# Patient Record
Sex: MALE | Race: WHITE | Employment: OTHER | URBAN - METROPOLITAN AREA
[De-identification: names, ages, dates, MRNs, and addresses within clinical notes are randomized per-mention and may not be internally consistent; named-entity substitution may affect disease eponyms.]

---

## 2022-10-28 ENCOUNTER — OFFICE VISIT (OUTPATIENT)
Dept: VASCULAR SURGERY | Age: 62
End: 2022-10-28
Payer: COMMERCIAL

## 2022-10-28 ENCOUNTER — PREP FOR PROCEDURE (OUTPATIENT)
Dept: VASCULAR SURGERY | Age: 62
End: 2022-10-28

## 2022-10-28 VITALS
HEART RATE: 89 BPM | WEIGHT: 165 LBS | OXYGEN SATURATION: 95 % | BODY MASS INDEX: 24.44 KG/M2 | DIASTOLIC BLOOD PRESSURE: 75 MMHG | HEIGHT: 69 IN | SYSTOLIC BLOOD PRESSURE: 161 MMHG

## 2022-10-28 DIAGNOSIS — I73.9 PVD (PERIPHERAL VASCULAR DISEASE) WITH CLAUDICATION (HCC): Primary | ICD-10-CM

## 2022-10-28 PROCEDURE — 99213 OFFICE O/P EST LOW 20 MIN: CPT | Performed by: SURGERY

## 2022-10-28 RX ORDER — LOSARTAN POTASSIUM 50 MG/1
50 TABLET ORAL DAILY
COMMUNITY
Start: 2022-08-27

## 2022-10-28 RX ORDER — ROSUVASTATIN CALCIUM 20 MG/1
TABLET, COATED ORAL
COMMUNITY
Start: 2022-09-06 | End: 2022-10-31

## 2022-10-28 RX ORDER — TADALAFIL 5 MG/1
5 TABLET ORAL DAILY
COMMUNITY
Start: 2022-09-12

## 2022-10-28 RX ORDER — PRAVASTATIN SODIUM 40 MG
40 TABLET ORAL DAILY
COMMUNITY
End: 2022-10-31

## 2022-10-28 ASSESSMENT — PATIENT HEALTH QUESTIONNAIRE - PHQ9
SUM OF ALL RESPONSES TO PHQ QUESTIONS 1-9: 0
SUM OF ALL RESPONSES TO PHQ9 QUESTIONS 1 & 2: 0
SUM OF ALL RESPONSES TO PHQ QUESTIONS 1-9: 0
1. LITTLE INTEREST OR PLEASURE IN DOING THINGS: 0
SUM OF ALL RESPONSES TO PHQ QUESTIONS 1-9: 0
SUM OF ALL RESPONSES TO PHQ QUESTIONS 1-9: 0
2. FEELING DOWN, DEPRESSED OR HOPELESS: 0

## 2022-10-28 NOTE — PROGRESS NOTES
[address]   [address] [facility]. [address] FAX: [phone]    Kingsley Quiles  : 1960    Chief Complaint:     History of Present Illness:   Patient follows up today for follow-up for worsening right leg claudication. He had a CT scan done earlier sure show he has significant right common femoral artery disease. CURRENT MEDICATIONS:  Current Outpatient Medications   Medication Sig Dispense Refill    losartan (COZAAR) 50 MG tablet Take 50 mg by mouth daily      tadalafil (CIALIS) 5 MG tablet       pravastatin (PRAVACHOL) 40 MG tablet Take 40 mg by mouth daily      rosuvastatin (CRESTOR) 20 MG tablet  (Patient not taking: Reported on 10/28/2022)       No current facility-administered medications for this visit. No past medical history on file. Physical Examination:   Height: 5' 9\" (1.753 m), Weight: 165 lb (74.8 kg), BP: (!) 161/75    Constitutional: he appears well-developed. No distress. HENT:   Head: Atraumatic. Eyes: Pupils are equal, round, and reactive to light. Neck: Normal range of motion. Cardiovascular: Regular rhythm. Pulmonary/Chest: Effort normal and breath sounds normal. No respiratory distress. Abdominal: Soft. Bowel sounds are normal. he exhibits no distension. There is no tenderness. There is no guarding. No hernia. Musculoskeletal: Normal range of motion. Neurological: He is alert. CN II- XII grossly intact   Vascular: Palp femoral pulses    Imaging:    CORRIE 0.6 with a high-grade right common femoral artery disease      Recommendations/Plans:   Mr. Kingsley Quiles is a 57y.o. year old male significant right common femoral artery disease with questionable iliac disease. I will schedule patient for right common femoral endarterectomy with aortogram.  I discussed with the patient the risk benefits either. We will schedule patient in near future.     Daisy Odonnell MD    Elements of this note have been dictated using speech recognition software. As a result, errors of speech recognition may have occurred.     30 minutes of time was spent on this encounter including chart review, assessment, evaluation and coordination of patient care

## 2022-10-31 ENCOUNTER — HOSPITAL ENCOUNTER (OUTPATIENT)
Dept: PREADMISSION TESTING | Age: 62
Discharge: HOME | DRG: 254 | End: 2022-11-03
Payer: COMMERCIAL

## 2022-10-31 VITALS
SYSTOLIC BLOOD PRESSURE: 142 MMHG | TEMPERATURE: 98.2 F | BODY MASS INDEX: 24.38 KG/M2 | HEIGHT: 69 IN | DIASTOLIC BLOOD PRESSURE: 82 MMHG | OXYGEN SATURATION: 97 % | RESPIRATION RATE: 16 BRPM | WEIGHT: 164.6 LBS | HEART RATE: 72 BPM

## 2022-10-31 LAB
ANION GAP SERPL CALC-SCNC: 5 MMOL/L (ref 2–11)
BUN SERPL-MCNC: 26 MG/DL (ref 8–23)
CALCIUM SERPL-MCNC: 9.5 MG/DL (ref 8.3–10.4)
CHLORIDE SERPL-SCNC: 107 MMOL/L (ref 101–110)
CO2 SERPL-SCNC: 25 MMOL/L (ref 21–32)
CREAT SERPL-MCNC: 0.64 MG/DL (ref 0.8–1.5)
EKG ATRIAL RATE: 62 BPM
EKG DIAGNOSIS: NORMAL
EKG P AXIS: 74 DEGREES
EKG P-R INTERVAL: 168 MS
EKG Q-T INTERVAL: 408 MS
EKG QRS DURATION: 92 MS
EKG QTC CALCULATION (BAZETT): 414 MS
EKG R AXIS: 56 DEGREES
EKG T AXIS: 37 DEGREES
EKG VENTRICULAR RATE: 62 BPM
ERYTHROCYTE [DISTWIDTH] IN BLOOD BY AUTOMATED COUNT: 13.2 % (ref 11.9–14.6)
GLUCOSE SERPL-MCNC: 109 MG/DL (ref 65–100)
HCT VFR BLD AUTO: 44.4 % (ref 41.1–50.3)
HGB BLD-MCNC: 14.9 G/DL (ref 13.6–17.2)
MCH RBC QN AUTO: 31.4 PG (ref 26.1–32.9)
MCHC RBC AUTO-ENTMCNC: 33.6 G/DL (ref 31.4–35)
MCV RBC AUTO: 93.7 FL (ref 82–102)
NRBC # BLD: 0 K/UL (ref 0–0.2)
PLATELET # BLD AUTO: 220 K/UL (ref 150–450)
PMV BLD AUTO: 9.2 FL (ref 9.4–12.3)
POTASSIUM SERPL-SCNC: 3.6 MMOL/L (ref 3.5–5.1)
RBC # BLD AUTO: 4.74 M/UL (ref 4.23–5.6)
SODIUM SERPL-SCNC: 137 MMOL/L (ref 133–143)
WBC # BLD AUTO: 7.6 K/UL (ref 4.3–11.1)

## 2022-10-31 PROCEDURE — 85027 COMPLETE CBC AUTOMATED: CPT

## 2022-10-31 PROCEDURE — 80048 BASIC METABOLIC PNL TOTAL CA: CPT

## 2022-10-31 PROCEDURE — 93005 ELECTROCARDIOGRAM TRACING: CPT | Performed by: ANESTHESIOLOGY

## 2022-10-31 RX ORDER — ROSUVASTATIN CALCIUM 20 MG/1
20 TABLET, COATED ORAL DAILY
COMMUNITY

## 2022-10-31 NOTE — PERIOP NOTE
Patient verified name and     Order for consent not found in EHR; patient verified. Type 3 surgery, Walk in assessment complete. Labs per surgeon: none;   Labs per anesthesia protocol: CBC, BMP, T/S DOS; results pending. EKG: EKG 10.31.22    Hospital approved surgical skin cleanser and instructions given per hospital policy. Patient provided with and instructed on educational handouts including Guide to Surgery, Pain Management, Hand Hygiene, Blood Transfusion Education, and [facility] Brochure. Patient answered medical/surgical history questions at their best of ability. All prior to admission medications documented in [facility]. Original medication prescription bottle were  visualized during patient appointment. Patient instructed to hold all vitamins 7 days prior to surgery and NSAIDS 5 days prior to surgery, patient verbalized understanding. Patient teach back successful and patient demonstrates knowledge of instructions.

## 2022-10-31 NOTE — PERIOP NOTE
PLEASE CONTINUE TAKING ALL PRESCRIPTION MEDICATIONS UP TO THE DAY OF SURGERY UNLESS OTHERWISE DIRECTED BELOW. DISCONTINUE all vitamins and supplements 7 days prior to surgery. DISCONTINUE Non-Steriodal Anti-Inflammatory (NSAIDS) such as Advil and Aleve 5 days prior to surgery. Home Medications to take  the day of surgery     Rosuvastatin (Crestor)     Tadalafil (Cialis)        Home Medications   to Hold   No Vitamins, Supplements, or Anti-Inflammatories such as Aleve, Ibuprofen, Excedrin, Motrin, or Advil. Comments      Hibiclens shower the night before surgery and the morning of surgery. On the day before surgery please take Acetaminophen 1000mg in the morning and then again before bed. You may substitute for Tylenol 650 mg. Please do not bring home medications with you on the day of surgery unless otherwise directed by your nurse. If you are instructed to bring home medications, please give them to your nurse as they will be administered by the nursing staff. If you have any questions, please call [address] [place] [address] [phone] or 0 [facility] [phone]. A copy of this note was provided to the patient for reference.

## 2022-11-02 ENCOUNTER — ANESTHESIA EVENT (OUTPATIENT)
Dept: SURGERY | Age: 62
DRG: 254 | End: 2022-11-02
Payer: COMMERCIAL

## 2022-11-02 NOTE — PERIOP NOTE
Directly informed patient and or family member of pre op arrival time 0 on 11/3/22. All questions answered. Pre op instructions reviewed. Left contact information for any additional questions or needs.

## 2022-11-03 ENCOUNTER — ANESTHESIA (OUTPATIENT)
Dept: SURGERY | Age: 62
DRG: 254 | End: 2022-11-03
Payer: COMMERCIAL

## 2022-11-03 ENCOUNTER — APPOINTMENT (OUTPATIENT)
Dept: INTERVENTIONAL RADIOLOGY/VASCULAR | Age: 62
DRG: 254 | End: 2022-11-03
Attending: SURGERY
Payer: COMMERCIAL

## 2022-11-03 ENCOUNTER — HOSPITAL ENCOUNTER (INPATIENT)
Age: 62
LOS: 1 days | Discharge: HOME | DRG: 254 | End: 2022-11-04
Attending: SURGERY | Admitting: SURGERY
Payer: COMMERCIAL

## 2022-11-03 DIAGNOSIS — I73.9 PAD (PERIPHERAL ARTERY DISEASE) (HCC): Primary | ICD-10-CM

## 2022-11-03 DIAGNOSIS — I73.9 PERIPHERAL ARTERIAL DISEASE (HCC): ICD-10-CM

## 2022-11-03 LAB
ABO + RH BLD: NORMAL
BLOOD GROUP ANTIBODIES SERPL: NORMAL
CREAT BLD-MCNC: 0.76 MG/DL (ref 0.8–1.5)
SPECIMEN EXP DATE BLD: NORMAL

## 2022-11-03 PROCEDURE — 7100000000 HC PACU RECOVERY - FIRST 15 MIN: Performed by: SURGERY

## 2022-11-03 PROCEDURE — 6360000002 HC RX W HCPCS: Performed by: NURSE ANESTHETIST, CERTIFIED REGISTERED

## 2022-11-03 PROCEDURE — 2709999900 HC NON-CHARGEABLE SUPPLY: Performed by: SURGERY

## 2022-11-03 PROCEDURE — C1769 GUIDE WIRE: HCPCS | Performed by: SURGERY

## 2022-11-03 PROCEDURE — 6370000000 HC RX 637 (ALT 250 FOR IP): Performed by: SURGERY

## 2022-11-03 PROCEDURE — 3600000007 HC SURGERY HYBRID BASE: Performed by: SURGERY

## 2022-11-03 PROCEDURE — 2580000003 HC RX 258: Performed by: NURSE ANESTHETIST, CERTIFIED REGISTERED

## 2022-11-03 PROCEDURE — 6370000000 HC RX 637 (ALT 250 FOR IP): Performed by: ANESTHESIOLOGY

## 2022-11-03 PROCEDURE — 2500000003 HC RX 250 WO HCPCS: Performed by: SURGERY

## 2022-11-03 PROCEDURE — 86901 BLOOD TYPING SEROLOGIC RH(D): CPT

## 2022-11-03 PROCEDURE — C1894 INTRO/SHEATH, NON-LASER: HCPCS | Performed by: SURGERY

## 2022-11-03 PROCEDURE — 1100000000 HC RM PRIVATE

## 2022-11-03 PROCEDURE — 35371 RECHANNELING OF ARTERY: CPT | Performed by: SURGERY

## 2022-11-03 PROCEDURE — 6360000004 HC RX CONTRAST MEDICATION: Performed by: SURGERY

## 2022-11-03 PROCEDURE — 6360000002 HC RX W HCPCS: Performed by: ANESTHESIOLOGY

## 2022-11-03 PROCEDURE — 6360000002 HC RX W HCPCS: Performed by: SURGERY

## 2022-11-03 PROCEDURE — 3700000000 HC ANESTHESIA ATTENDED CARE: Performed by: SURGERY

## 2022-11-03 PROCEDURE — 2580000003 HC RX 258: Performed by: SURGERY

## 2022-11-03 PROCEDURE — 3700000001 HC ADD 15 MINUTES (ANESTHESIA): Performed by: SURGERY

## 2022-11-03 PROCEDURE — 82565 ASSAY OF CREATININE: CPT

## 2022-11-03 PROCEDURE — 3600000017 HC SURGERY HYBRID ADDL 15MIN: Performed by: SURGERY

## 2022-11-03 PROCEDURE — 2500000003 HC RX 250 WO HCPCS: Performed by: NURSE ANESTHETIST, CERTIFIED REGISTERED

## 2022-11-03 PROCEDURE — 7100000001 HC PACU RECOVERY - ADDTL 15 MIN: Performed by: SURGERY

## 2022-11-03 PROCEDURE — C1768 GRAFT, VASCULAR: HCPCS | Performed by: SURGERY

## 2022-11-03 PROCEDURE — 2580000003 HC RX 258: Performed by: ANESTHESIOLOGY

## 2022-11-03 DEVICE — VASCURE FOR VASCULAR REPAIR IS INTENDED FOR USE AS A PATCH MATERIAL FOR REPAIR AND RECONSTRUCTION OF PERIPHERAL VASCULATURE INCLUDING THE CAROTID, RENAL, ILIAC, FEMORAL, AND TIBIAL BLOOD VESSELS. VASCURE FOR VASCULAR REPAIR MAY BE USED FOR PATCH CLOSURE OF VESSELS, AS A PLEDGET, OR FOR SUTURE LINE BUTTRESSING WHEN REPAIRING PERIPHERAL VESSELS.
Type: IMPLANTABLE DEVICE | Site: GROIN | Status: FUNCTIONAL
Brand: VASCURE FOR VASCULAR REPAIR

## 2022-11-03 RX ORDER — SODIUM CHLORIDE, SODIUM LACTATE, POTASSIUM CHLORIDE, CALCIUM CHLORIDE 600; 310; 30; 20 MG/100ML; MG/100ML; MG/100ML; MG/100ML
INJECTION, SOLUTION INTRAVENOUS CONTINUOUS
Status: DISCONTINUED | OUTPATIENT
Start: 2022-11-03 | End: 2022-11-03 | Stop reason: HOSPADM

## 2022-11-03 RX ORDER — HEPARIN SODIUM 5000 [USP'U]/ML
INJECTION, SOLUTION INTRAVENOUS; SUBCUTANEOUS PRN
Status: DISCONTINUED | OUTPATIENT
Start: 2022-11-03 | End: 2022-11-03 | Stop reason: HOSPADM

## 2022-11-03 RX ORDER — PROPOFOL 10 MG/ML
INJECTION, EMULSION INTRAVENOUS PRN
Status: DISCONTINUED | OUTPATIENT
Start: 2022-11-03 | End: 2022-11-03 | Stop reason: SDUPTHER

## 2022-11-03 RX ORDER — LIDOCAINE HYDROCHLORIDE 10 MG/ML
1 INJECTION, SOLUTION INFILTRATION; PERINEURAL
Status: DISCONTINUED | OUTPATIENT
Start: 2022-11-03 | End: 2022-11-03 | Stop reason: HOSPADM

## 2022-11-03 RX ORDER — FENTANYL CITRATE 50 UG/ML
50 INJECTION, SOLUTION INTRAMUSCULAR; INTRAVENOUS EVERY 5 MIN PRN
Status: DISCONTINUED | OUTPATIENT
Start: 2022-11-03 | End: 2022-11-03 | Stop reason: HOSPADM

## 2022-11-03 RX ORDER — GLYCOPYRROLATE 0.2 MG/ML
INJECTION INTRAMUSCULAR; INTRAVENOUS PRN
Status: DISCONTINUED | OUTPATIENT
Start: 2022-11-03 | End: 2022-11-03 | Stop reason: SDUPTHER

## 2022-11-03 RX ORDER — LIDOCAINE HYDROCHLORIDE 20 MG/ML
INJECTION, SOLUTION EPIDURAL; INFILTRATION; INTRACAUDAL; PERINEURAL PRN
Status: DISCONTINUED | OUTPATIENT
Start: 2022-11-03 | End: 2022-11-03 | Stop reason: SDUPTHER

## 2022-11-03 RX ORDER — MIDAZOLAM HYDROCHLORIDE 2 MG/2ML
2 INJECTION, SOLUTION INTRAMUSCULAR; INTRAVENOUS
Status: DISCONTINUED | OUTPATIENT
Start: 2022-11-03 | End: 2022-11-03 | Stop reason: HOSPADM

## 2022-11-03 RX ORDER — SODIUM CHLORIDE 0.9 % (FLUSH) 0.9 %
5-40 SYRINGE (ML) INJECTION EVERY 12 HOURS SCHEDULED
Status: DISCONTINUED | OUTPATIENT
Start: 2022-11-03 | End: 2022-11-03 | Stop reason: HOSPADM

## 2022-11-03 RX ORDER — ONDANSETRON 2 MG/ML
INJECTION INTRAMUSCULAR; INTRAVENOUS PRN
Status: DISCONTINUED | OUTPATIENT
Start: 2022-11-03 | End: 2022-11-03 | Stop reason: SDUPTHER

## 2022-11-03 RX ORDER — BUPIVACAINE HYDROCHLORIDE 2.5 MG/ML
INJECTION, SOLUTION EPIDURAL; INFILTRATION; INTRACAUDAL PRN
Status: DISCONTINUED | OUTPATIENT
Start: 2022-11-03 | End: 2022-11-03 | Stop reason: HOSPADM

## 2022-11-03 RX ORDER — SODIUM CHLORIDE 9 MG/ML
INJECTION, SOLUTION INTRAVENOUS CONTINUOUS
Status: DISCONTINUED | OUTPATIENT
Start: 2022-11-03 | End: 2022-11-04 | Stop reason: HOSPADM

## 2022-11-03 RX ORDER — FENTANYL CITRATE 50 UG/ML
INJECTION, SOLUTION INTRAMUSCULAR; INTRAVENOUS PRN
Status: DISCONTINUED | OUTPATIENT
Start: 2022-11-03 | End: 2022-11-03 | Stop reason: SDUPTHER

## 2022-11-03 RX ORDER — HYDROMORPHONE HYDROCHLORIDE 2 MG/ML
0.5 INJECTION, SOLUTION INTRAMUSCULAR; INTRAVENOUS; SUBCUTANEOUS EVERY 10 MIN PRN
Status: DISCONTINUED | OUTPATIENT
Start: 2022-11-03 | End: 2022-11-03 | Stop reason: HOSPADM

## 2022-11-03 RX ORDER — ACETAMINOPHEN 325 MG/1
650 TABLET ORAL EVERY 4 HOURS PRN
Status: DISCONTINUED | OUTPATIENT
Start: 2022-11-03 | End: 2022-11-04 | Stop reason: HOSPADM

## 2022-11-03 RX ORDER — HEPARIN SODIUM 10000 [USP'U]/ML
INJECTION, SOLUTION INTRAVENOUS; SUBCUTANEOUS PRN
Status: DISCONTINUED | OUTPATIENT
Start: 2022-11-03 | End: 2022-11-03 | Stop reason: SDUPTHER

## 2022-11-03 RX ORDER — OXYCODONE HYDROCHLORIDE 5 MG/1
5 TABLET ORAL
Status: DISCONTINUED | OUTPATIENT
Start: 2022-11-03 | End: 2022-11-03 | Stop reason: HOSPADM

## 2022-11-03 RX ORDER — ATROPA BELLADONNA AND OPIUM 16.2; 3 MG/1; MG/1
30 SUPPOSITORY RECTAL
Status: DISCONTINUED | OUTPATIENT
Start: 2022-11-03 | End: 2022-11-04 | Stop reason: HOSPADM

## 2022-11-03 RX ORDER — PHENYLEPHRINE HYDROCHLORIDE 10 MG/ML
INJECTION INTRAVENOUS PRN
Status: DISCONTINUED | OUTPATIENT
Start: 2022-11-03 | End: 2022-11-03 | Stop reason: SDUPTHER

## 2022-11-03 RX ORDER — OXYCODONE HYDROCHLORIDE AND ACETAMINOPHEN 5; 325 MG/1; MG/1
1 TABLET ORAL EVERY 4 HOURS PRN
Status: DISCONTINUED | OUTPATIENT
Start: 2022-11-03 | End: 2022-11-04 | Stop reason: HOSPADM

## 2022-11-03 RX ORDER — HALOPERIDOL 5 MG/ML
1 INJECTION INTRAMUSCULAR
Status: DISCONTINUED | OUTPATIENT
Start: 2022-11-03 | End: 2022-11-03 | Stop reason: HOSPADM

## 2022-11-03 RX ORDER — EPHEDRINE SULFATE/0.9% NACL/PF 50 MG/5 ML
SYRINGE (ML) INTRAVENOUS PRN
Status: DISCONTINUED | OUTPATIENT
Start: 2022-11-03 | End: 2022-11-03 | Stop reason: SDUPTHER

## 2022-11-03 RX ORDER — HEPARIN SODIUM 200 [USP'U]/100ML
INJECTION, SOLUTION INTRAVENOUS CONTINUOUS PRN
Status: DISCONTINUED | OUTPATIENT
Start: 2022-11-03 | End: 2022-11-03 | Stop reason: HOSPADM

## 2022-11-03 RX ORDER — ATROPA BELLADONNA AND OPIUM 16.2; 6 MG/1; MG/1
60 SUPPOSITORY RECTAL
Status: DISCONTINUED | OUTPATIENT
Start: 2022-11-03 | End: 2022-11-03 | Stop reason: CLARIF

## 2022-11-03 RX ORDER — ACETAMINOPHEN 500 MG
1000 TABLET ORAL ONCE
Status: COMPLETED | OUTPATIENT
Start: 2022-11-03 | End: 2022-11-03

## 2022-11-03 RX ORDER — PHENAZOPYRIDINE HYDROCHLORIDE 95 MG/1
200 TABLET ORAL
Status: COMPLETED | OUTPATIENT
Start: 2022-11-03 | End: 2022-11-03

## 2022-11-03 RX ORDER — ROCURONIUM BROMIDE 10 MG/ML
INJECTION, SOLUTION INTRAVENOUS PRN
Status: DISCONTINUED | OUTPATIENT
Start: 2022-11-03 | End: 2022-11-03 | Stop reason: SDUPTHER

## 2022-11-03 RX ORDER — MORPHINE SULFATE 2 MG/ML
2 INJECTION, SOLUTION INTRAMUSCULAR; INTRAVENOUS
Status: DISCONTINUED | OUTPATIENT
Start: 2022-11-03 | End: 2022-11-04 | Stop reason: HOSPADM

## 2022-11-03 RX ORDER — IPRATROPIUM BROMIDE AND ALBUTEROL SULFATE 2.5; .5 MG/3ML; MG/3ML
1 SOLUTION RESPIRATORY (INHALATION)
Status: DISCONTINUED | OUTPATIENT
Start: 2022-11-03 | End: 2022-11-03 | Stop reason: HOSPADM

## 2022-11-03 RX ORDER — NEOSTIGMINE METHYLSULFATE 1 MG/ML
INJECTION, SOLUTION INTRAVENOUS PRN
Status: DISCONTINUED | OUTPATIENT
Start: 2022-11-03 | End: 2022-11-03 | Stop reason: SDUPTHER

## 2022-11-03 RX ORDER — PROTAMINE SULFATE 10 MG/ML
INJECTION, SOLUTION INTRAVENOUS PRN
Status: DISCONTINUED | OUTPATIENT
Start: 2022-11-03 | End: 2022-11-03 | Stop reason: SDUPTHER

## 2022-11-03 RX ORDER — ONDANSETRON 2 MG/ML
4 INJECTION INTRAMUSCULAR; INTRAVENOUS
Status: DISCONTINUED | OUTPATIENT
Start: 2022-11-03 | End: 2022-11-03 | Stop reason: HOSPADM

## 2022-11-03 RX ORDER — SODIUM CHLORIDE 0.9 % (FLUSH) 0.9 %
5-40 SYRINGE (ML) INJECTION PRN
Status: DISCONTINUED | OUTPATIENT
Start: 2022-11-03 | End: 2022-11-03 | Stop reason: HOSPADM

## 2022-11-03 RX ADMIN — HEPARIN SODIUM 11000 UNITS: 10000 INJECTION, SOLUTION INTRAVENOUS; SUBCUTANEOUS at 07:56

## 2022-11-03 RX ADMIN — SODIUM CHLORIDE, SODIUM LACTATE, POTASSIUM CHLORIDE, AND CALCIUM CHLORIDE: 600; 310; 30; 20 INJECTION, SOLUTION INTRAVENOUS at 07:09

## 2022-11-03 RX ADMIN — PROPOFOL 60 MG: 10 INJECTION, EMULSION INTRAVENOUS at 07:22

## 2022-11-03 RX ADMIN — GLYCOPYRROLATE 0.2 MG: 0.2 INJECTION, SOLUTION INTRAMUSCULAR; INTRAVENOUS at 07:33

## 2022-11-03 RX ADMIN — HYDROMORPHONE HYDROCHLORIDE 0.5 MG: 2 INJECTION INTRAMUSCULAR; INTRAVENOUS; SUBCUTANEOUS at 10:00

## 2022-11-03 RX ADMIN — SODIUM CHLORIDE, SODIUM LACTATE, POTASSIUM CHLORIDE, AND CALCIUM CHLORIDE: 600; 310; 30; 20 INJECTION, SOLUTION INTRAVENOUS at 05:54

## 2022-11-03 RX ADMIN — HEPARIN SODIUM 2000 UNITS: 10000 INJECTION, SOLUTION INTRAVENOUS; SUBCUTANEOUS at 08:05

## 2022-11-03 RX ADMIN — Medication 10 MG: at 07:38

## 2022-11-03 RX ADMIN — PHENYLEPHRINE HYDROCHLORIDE 200 MCG: 10 INJECTION INTRAVENOUS at 07:34

## 2022-11-03 RX ADMIN — SODIUM CHLORIDE, SODIUM LACTATE, POTASSIUM CHLORIDE, AND CALCIUM CHLORIDE: 600; 310; 30; 20 INJECTION, SOLUTION INTRAVENOUS at 08:57

## 2022-11-03 RX ADMIN — SODIUM CHLORIDE: 9 INJECTION, SOLUTION INTRAVENOUS at 12:54

## 2022-11-03 RX ADMIN — ONDANSETRON 4 MG: 2 INJECTION INTRAMUSCULAR; INTRAVENOUS at 07:49

## 2022-11-03 RX ADMIN — GLYCOPYRROLATE 0.4 MG: 0.2 INJECTION, SOLUTION INTRAMUSCULAR; INTRAVENOUS at 09:11

## 2022-11-03 RX ADMIN — ROCURONIUM BROMIDE 40 MG: 50 INJECTION, SOLUTION INTRAVENOUS at 07:20

## 2022-11-03 RX ADMIN — ACETAMINOPHEN 1000 MG: 500 TABLET, FILM COATED ORAL at 05:55

## 2022-11-03 RX ADMIN — PHENYLEPHRINE HYDROCHLORIDE 100 MCG: 10 INJECTION INTRAVENOUS at 07:40

## 2022-11-03 RX ADMIN — OXYCODONE AND ACETAMINOPHEN 1 TABLET: 5; 325 TABLET ORAL at 16:48

## 2022-11-03 RX ADMIN — LIDOCAINE HYDROCHLORIDE 100 MG: 20 INJECTION, SOLUTION EPIDURAL; INFILTRATION; INTRACAUDAL; PERINEURAL at 07:20

## 2022-11-03 RX ADMIN — PHENYLEPHRINE HYDROCHLORIDE 200 MCG: 10 INJECTION INTRAVENOUS at 07:38

## 2022-11-03 RX ADMIN — Medication 3 MG: at 09:11

## 2022-11-03 RX ADMIN — FENTANYL CITRATE 100 MCG: 50 INJECTION, SOLUTION INTRAMUSCULAR; INTRAVENOUS at 07:20

## 2022-11-03 RX ADMIN — CEFAZOLIN SODIUM 2000 MG: 100 INJECTION, POWDER, LYOPHILIZED, FOR SOLUTION INTRAVENOUS at 17:30

## 2022-11-03 RX ADMIN — HYDROMORPHONE HYDROCHLORIDE 0.5 MG: 2 INJECTION INTRAMUSCULAR; INTRAVENOUS; SUBCUTANEOUS at 10:31

## 2022-11-03 RX ADMIN — PROTAMINE SULFATE 50 MG: 10 INJECTION, SOLUTION INTRAVENOUS at 09:00

## 2022-11-03 RX ADMIN — ROCURONIUM BROMIDE 10 MG: 50 INJECTION, SOLUTION INTRAVENOUS at 08:30

## 2022-11-03 RX ADMIN — ACETAMINOPHEN 650 MG: 325 TABLET, FILM COATED ORAL at 22:19

## 2022-11-03 RX ADMIN — ROCURONIUM BROMIDE 20 MG: 50 INJECTION, SOLUTION INTRAVENOUS at 08:04

## 2022-11-03 RX ADMIN — PROPOFOL 100 MG: 10 INJECTION, EMULSION INTRAVENOUS at 07:20

## 2022-11-03 RX ADMIN — Medication 10 MG: at 07:35

## 2022-11-03 RX ADMIN — PHENYLEPHRINE HYDROCHLORIDE 200 MCG: 10 INJECTION INTRAVENOUS at 08:55

## 2022-11-03 RX ADMIN — Medication 2000 MG: at 07:15

## 2022-11-03 RX ADMIN — PHENYLEPHRINE HYDROCHLORIDE 10 MCG/MIN: 10 INJECTION INTRAVENOUS at 08:15

## 2022-11-03 RX ADMIN — HYDROMORPHONE HYDROCHLORIDE 0.5 MG: 2 INJECTION INTRAMUSCULAR; INTRAVENOUS; SUBCUTANEOUS at 10:10

## 2022-11-03 RX ADMIN — OXYCODONE AND ACETAMINOPHEN 1 TABLET: 5; 325 TABLET ORAL at 12:52

## 2022-11-03 RX ADMIN — PHENAZOPYRIDINE HYDROCHLORIDE 190 MG: 95 TABLET ORAL at 10:31

## 2022-11-03 ASSESSMENT — PAIN DESCRIPTION - DESCRIPTORS
DESCRIPTORS: SORE

## 2022-11-03 ASSESSMENT — PAIN SCALES - GENERAL
PAINLEVEL_OUTOF10: 8
PAINLEVEL_OUTOF10: 7
PAINLEVEL_OUTOF10: 3
PAINLEVEL_OUTOF10: 6
PAINLEVEL_OUTOF10: 6

## 2022-11-03 ASSESSMENT — PAIN DESCRIPTION - ORIENTATION
ORIENTATION: RIGHT

## 2022-11-03 ASSESSMENT — PAIN DESCRIPTION - LOCATION
LOCATION: GROIN
LOCATION: LEG
LOCATION: LEG
LOCATION: GROIN
LOCATION: LEG

## 2022-11-03 ASSESSMENT — PAIN - FUNCTIONAL ASSESSMENT
PAIN_FUNCTIONAL_ASSESSMENT: 0-10
PAIN_FUNCTIONAL_ASSESSMENT: ACTIVITIES ARE NOT PREVENTED
PAIN_FUNCTIONAL_ASSESSMENT: 0-10
PAIN_FUNCTIONAL_ASSESSMENT: NONE - DENIES PAIN
PAIN_FUNCTIONAL_ASSESSMENT: ACTIVITIES ARE NOT PREVENTED

## 2022-11-03 ASSESSMENT — LIFESTYLE VARIABLES: SMOKING_STATUS: 1

## 2022-11-03 NOTE — PROGRESS NOTES
Status post right common femoral endarterectomy. Patient was transferred to the floor overnight for observation. Patient has palpable femoral pulses nonpalpable pedal pulses.     Thanh Leaks

## 2022-11-03 NOTE — ANESTHESIA PROCEDURE NOTES
Airway  Date/Time: 11/3/2022 7:24 AM  Urgency: elective    Airway not difficult    General Information and Staff    Patient location during procedure: OR  Anesthesiologist: Lisseth Norman MD  Resident/CRNA: CLAUDIA Kuhn - CRNA  Performed: resident/CRNA     Indications and Patient Condition  Indications for airway management: anesthesia  Spontaneous Ventilation: absent  Sedation level: deep  Preoxygenated: yes  Patient position: sniffing  MILS not maintained throughout  Mask difficulty assessment: vent by bag mask    Final Airway Details  Final airway type: endotracheal airway      Successful airway: ETT  Cuffed: yes   Successful intubation technique: direct laryngoscopy  Facilitating devices/methods: intubating stylet  Endotracheal tube insertion site: oral  Blade: Buckner  Blade size: #3  ETT size (mm): 8.0  Cormack-Lehane Classification: grade I - full view of glottis  Placement verified by: chest auscultation and capnometry   Measured from: teeth  ETT to teeth (cm): 23  Number of attempts at approach: 1  Ventilation between attempts: bag mask  Number of other approaches attempted: 0    no

## 2022-11-03 NOTE — OP NOTE
[facility]  OPERATIVE REPORT    Name:  Kelton Kendall  MR#:  [id number]  :  1960  ACCOUNT #:  [de-identified]  DATE OF SERVICE:  2022    SERVICE:  Vascular Surgery    PREOPERATIVE DIAGNOSIS:  Debilitating right leg claudication. POSTOPERATIVE DIAGNOSIS:  Debilitating right leg claudication. PROCEDURE PERFORMED:  Right common femoral endarterectomy with bovine pericardial patch closure. SURGEON:  Laurent Rondon MD    ASSISTANT:  None. ANESTHESIA:  General.    COMPLICATIONS:  None. SPECIMENS REMOVED:  None. IMPLANTS:      ESTIMATED BLOOD LOSS:      PROCEDURE:  After getting informed consent, the patient brought to the operating room. Anesthesia was then induced. Preop antibiotics were given before skin incisions. The patient's right leg was then prepped and draped in normal sterile fashion. Incision was made with #15 blade on the inguinal ligament. We dissected down through subcutaneous tissue. Down to the proximal SFA and profunda which was controlled with vessel loops. All side branches were also controlled with vessel loops including the medial and lateral epigastric. The distal external was controlled with vessel loops. Then, we gave 13,000 heparin. We got control. 11-blade was used to made in the proximal SFA and extended through the heavily diseased near occlusive plaque in the common just on the inguinal ligament. The vein of sorrow was identified and suture ligated with 2-0 silk ties. Using a Pe Ell elevator, we removed the plaque just proximal to the profunda orifices and removed all the way up into the just below the inguinal ligament in one piece. It was concentrated plaque. All remaining debris was removed with fine forceps. We backflushed the SFA, the profunda and external.  We then brought to field a CorMatrix patch. We did patch angioplasty with 6-0 Prolene proximally. We ran on both sides and ran distally.   Before we flushed the external, backflushed the profunda and SFA. We restored flow first into the profunda then to the SFA. Patient had good Doppler signal to profunda and SFA. We held pressure for five minutes and closed with 2-0 Vicryl, 3-0 Vicryl, and 4-0 Monocryl. The patient was extubated and taken to PACU in stable condition.       Lila Hartman MD      [id number]  D:  11/03/2022 9:48  T:  11/03/2022 11:50  JOB #:  [id number]

## 2022-11-03 NOTE — ANESTHESIA PRE PROCEDURE
Department of Anesthesiology  Preprocedure Note       Name:  Mayda Owusu   Age:  57 y.o.  :  1960                                          MRN:  [id number]         Date:  11/3/2022      Surgeon: Eve Perez):  Maribel Jolley MD    Procedure: Procedure(s):  RIGHT COMMON FEMORAL ENDARTERECTOMY W/ARTERIOGRAM  ARTERIOGRAM    Medications prior to admission:   Prior to Admission medications    Medication Sig Start Date End Date Taking?  Authorizing Provider   rosuvastatin (CRESTOR) 20 MG tablet Take 20 mg by mouth daily    Historical Provider, MD   losartan (COZAAR) 50 MG tablet Take 50 mg by mouth daily 22   Historical Provider, MD   tadalafil (CIALIS) 5 MG tablet Take 5 mg by mouth daily 22   Historical Provider, MD       Current medications:    Current Facility-Administered Medications   Medication Dose Route Frequency Provider Last Rate Last Admin    ceFAZolin (ANCEF) 2000 mg in sterile water 20 mL IV syringe  2,000 mg IntraVENous Once Maribel Jolley MD        lidocaine 1 % injection 1 mL  1 mL IntraDERmal Once PRN Luis Golden MD        famotidine (PEPCID) 20 mg in sodium chloride (PF) 0.9 % 10 mL injection  20 mg IntraVENous Once PRN Luis Golden MD        lactated ringers infusion   IntraVENous Continuous Luis Golden MD 30 mL/hr at 22 0554 New Bag at 22 0554    midazolam PF (VERSED) injection 2 mg  2 mg IntraVENous Once PRN Luis Golden MD        ipratropium-albuterol (DUONEB) nebulizer solution 1 ampule  1 ampule Inhalation Once PRN Luis Golden MD           Allergies:  No Known Allergies    Problem List:    Patient Active Problem List   Diagnosis Code    Peripheral arterial disease ([facility] [address] I73.9       Past Medical History:        Diagnosis Date    Cigarette nicotine dependence     HTN (hypertension)     Hypercholesterolemia     Peripheral arterial disease ([facility] [address]     Prostate enlargement     Right leg pain        Past Surgical History: Procedure Laterality Date    COLONOSCOPY      FEMORAL ENDARTERECTOMY Left     KNEE ARTHROSCOPY Bilateral     NECK SURGERY      SHOULDER ARTHROSCOPY Bilateral     TONSILLECTOMY      WISDOM TOOTH EXTRACTION         Social History:    Social History     Tobacco Use    Smoking status: Every Day     Packs/day: 1.00     Years: 44.00     Pack years: 44.00     Types: Cigarettes     Start date: 1972    Smokeless tobacco: Never   Substance Use Topics    Alcohol use: Yes     Comment: occassionally                                Ready to quit: Not Answered  Counseling given: Not Answered      Vital Signs (Current):   Vitals:    11/03/22 0533   BP: (!) 154/90   Pulse: 72   Resp: 18   Temp: 97.2 °F (36.2 °C)   TempSrc: Oral   SpO2: 95%   Weight: 165 lb 4 oz (75 kg)   Height: 5' 9\" (1.753 m)                                              BP Readings from Last 3 Encounters:   11/03/22 (!) 154/90   10/31/22 (!) 142/82   10/28/22 (!) 161/75       NPO Status: Time of last liquid consumption: 0000                        Time of last solid consumption: 0000                        Date of last liquid consumption: 11/02/22                        Date of last solid food consumption: 11/02/22    BMI:   Wt Readings from Last 3 Encounters:   11/03/22 165 lb 4 oz (75 kg)   10/31/22 164 lb 9.6 oz (74.7 kg)   10/28/22 165 lb (74.8 kg)     Body mass index is 24.4 kg/m².     CBC:   Lab Results   Component Value Date/Time    WBC 7.6 10/31/2022 11:31 AM    RBC 4.74 10/31/2022 11:31 AM    HGB 14.9 10/31/2022 11:31 AM    HCT 44.4 10/31/2022 11:31 AM    MCV 93.7 10/31/2022 11:31 AM    RDW 13.2 10/31/2022 11:31 AM     10/31/2022 11:31 AM       CMP:   Lab Results   Component Value Date/Time     10/31/2022 11:31 AM    K 3.6 10/31/2022 11:31 AM     10/31/2022 11:31 AM    CO2 25 10/31/2022 11:31 AM    BUN 26 10/31/2022 11:31 AM    CREATININE 0.76 11/03/2022 05:50 AM    CREATININE 0.64 10/31/2022 11:31 AM    LABGLOM >60 10/31/2022 11:31 AM    GLUCOSE 109 10/31/2022 11:31 AM    CALCIUM 9.5 10/31/2022 11:31 AM       POC Tests: No results for input(s): POCGLU, POCNA, POCK, POCCL, POCBUN, POCHEMO, POCHCT in the last 72 hours. Coags: No results found for: PROTIME, INR, APTT    HCG (If Applicable): No results found for: PREGTESTUR, PREGSERUM, HCG, HCGQUANT     ABGs: No results found for: PHART, PO2ART, TQK3ZQE, HKG5QJL, BEART, T2JPBBSN     Type & Screen (If Applicable):  No results found for: LABABO, LABRH    Drug/Infectious Status (If Applicable):  No results found for: HIV, HEPCAB    COVID-19 Screening (If Applicable): No results found for: COVID19        Anesthesia Evaluation  Patient summary reviewed  Airway: Mallampati: II          Dental: normal exam         Pulmonary:Negative Pulmonary ROS breath sounds clear to auscultation  (+) current smoker                           Cardiovascular:  Exercise tolerance: good (>4 METS),   (+) hypertension:,     (-) past MI, murmur and peripheral edema      Rhythm: regular  Rate: normal                    Neuro/Psych:   Negative Neuro/Psych ROS     (-) CVA           GI/Hepatic/Renal: Neg GI/Hepatic/Renal ROS            Endo/Other: Negative Endo/Other ROS                    Abdominal:             Vascular: negative vascular ROS.  + PVD, aortic or cerebral, . Other Findings:           Anesthesia Plan      general     ASA 3     (GETA)  Induction: intravenous. Anesthetic plan and risks discussed with patient.                         Sia Flores MD   11/3/2022

## 2022-11-03 NOTE — PLAN OF CARE
Problem: Pain  Goal: Verbalizes/displays adequate comfort level or baseline comfort level  Outcome: Progressing     Problem: Discharge Planning  Goal: Discharge to home or other facility with appropriate resources  Outcome: Progressing  Flowsheets (Taken 11/3/2022 1252)  Discharge to home or other facility with appropriate resources:   Arrange for needed discharge resources and transportation as appropriate   Identify barriers to discharge with patient and caregiver   Identify discharge learning needs (meds, wound care, etc)   Refer to discharge planning if patient needs post-hospital services based on physician order or complex needs related to functional status, cognitive ability or social support system

## 2022-11-03 NOTE — ANESTHESIA POSTPROCEDURE EVALUATION
Department of Anesthesiology  Postprocedure Note    Patient: Jhonny Moore  MRN: [id number]  YOB: 1960  Date of evaluation: 11/3/2022      Procedure Summary     Date: 11/03/22 Room / Location: [facility] MAIN OR  / [facility] MAIN OR    Anesthesia Start: 4485 Anesthesia Stop: 8811    Procedure: RIGHT COMMON FEMORAL ENDARTERECTOMY (Right: Groin) Diagnosis:       Peripheral arterial disease ([address]      (Peripheral arterial disease ([address] [I73.9])    Providers: Mahendra Mendez MD Responsible Provider: Pierre Levin MD    Anesthesia Type: general ASA Status: 3          Anesthesia Type: No value filed.     Jimenez Phase I: Jimenez Score: 8    Jimenez Phase II:        Anesthesia Post Evaluation    Patient location during evaluation: PACU  Patient participation: complete - patient participated  Level of consciousness: awake and alert  Airway patency: patent  Nausea & Vomiting: no nausea and no vomiting  Complications: no  Cardiovascular status: hemodynamically stable  Respiratory status: acceptable, nonlabored ventilation and spontaneous ventilation  Hydration status: euvolemic  Comments: /76   Pulse 69   Temp 97.9 °F (36.6 °C) (Temporal)   Resp (P) 16   Ht 5' 9\" (1.753 m)   Wt 165 lb 4 oz (75 kg)   SpO2 98%   BMI 24.40 kg/m²     Multimodal analgesia pain management approach

## 2022-11-03 NOTE — PERIOP NOTE
Dr. Jaylen Berry at [facility]. Gave RN order to remove catheter in PACU. RN to place dc order.

## 2022-11-03 NOTE — BRIEF OP NOTE
[address]   [address] FAX: [phone]    Brief Op Note Template Note    Pre-Op Diagnosis: Peripheral arterial disease ([facility] [address] [I73.9]    Post-Op Diagnosis:  * No post-op diagnosis entered *    Procedures: Procedure(s) with comments:  RIGHT COMMON FEMORAL ENDARTERECTOMY W/ARTERIOGRAM - BOOK IN OR #12    Surgeon: Tapan Forbes MD    Assistants: Surgeon(s):  Erasmo Rutledge MD      Anesthesia:  General     Findings: right  femoral plaque    Tourniquet Time:  * No tourniquets in log *    Estimated Blood Loss:               Specimens:            Implants:    Implant Name Type Inv. Item Serial No.  Lot No. LRB No. Used Action   PATCH CAR TISS REP [employer] 1 X 10 CM - [id number]  PATCH CAR TISS REP [employer] 1 X 10 CM  [employer] [id number] Right 1 Implanted       Complications: None               Signed: Tapan Forbes MD      Elements of this note have been dictated using speech recognition software. As a result, errors of speech recognition may have occurred.

## 2022-11-04 VITALS
DIASTOLIC BLOOD PRESSURE: 84 MMHG | OXYGEN SATURATION: 96 % | SYSTOLIC BLOOD PRESSURE: 133 MMHG | WEIGHT: 165.25 LBS | HEIGHT: 69 IN | BODY MASS INDEX: 24.48 KG/M2 | HEART RATE: 73 BPM | RESPIRATION RATE: 17 BRPM | TEMPERATURE: 98.2 F

## 2022-11-04 PROCEDURE — 6360000002 HC RX W HCPCS: Performed by: SURGERY

## 2022-11-04 PROCEDURE — 2500000003 HC RX 250 WO HCPCS: Performed by: SURGERY

## 2022-11-04 PROCEDURE — 04CK0ZZ EXTIRPATION OF MATTER FROM RIGHT FEMORAL ARTERY, OPEN APPROACH: ICD-10-PCS | Performed by: SURGERY

## 2022-11-04 PROCEDURE — 04UK0KZ SUPPLEMENT RIGHT FEMORAL ARTERY WITH NONAUTOLOGOUS TISSUE SUBSTITUTE, OPEN APPROACH: ICD-10-PCS | Performed by: SURGERY

## 2022-11-04 PROCEDURE — 6370000000 HC RX 637 (ALT 250 FOR IP): Performed by: SURGERY

## 2022-11-04 PROCEDURE — 99024 POSTOP FOLLOW-UP VISIT: CPT | Performed by: SURGERY

## 2022-11-04 RX ORDER — OXYCODONE HYDROCHLORIDE AND ACETAMINOPHEN 5; 325 MG/1; MG/1
1 TABLET ORAL EVERY 4 HOURS PRN
Qty: 30 TABLET | Refills: 0 | Status: SHIPPED | OUTPATIENT
Start: 2022-11-04 | End: 2022-11-07

## 2022-11-04 RX ADMIN — OXYCODONE AND ACETAMINOPHEN 1 TABLET: 5; 325 TABLET ORAL at 09:06

## 2022-11-04 RX ADMIN — CEFAZOLIN SODIUM 2000 MG: 100 INJECTION, POWDER, LYOPHILIZED, FOR SOLUTION INTRAVENOUS at 01:42

## 2022-11-04 ASSESSMENT — PAIN DESCRIPTION - LOCATION: LOCATION: GROIN

## 2022-11-04 ASSESSMENT — PAIN DESCRIPTION - DESCRIPTORS: DESCRIPTORS: ACHING

## 2022-11-04 ASSESSMENT — PAIN DESCRIPTION - ORIENTATION: ORIENTATION: RIGHT

## 2022-11-04 ASSESSMENT — PAIN SCALES - GENERAL: PAINLEVEL_OUTOF10: 8

## 2022-11-04 NOTE — PLAN OF CARE
Problem: Pain  Goal: Verbalizes/displays adequate comfort level or baseline comfort level  Outcome: Adequate for Discharge     Problem: Discharge Planning  Goal: Discharge to home or other facility with appropriate resources  Outcome: Adequate for Discharge  Flowsheets (Taken 11/3/2022 1940 by Wolf Poole RN)  Discharge to home or other facility with appropriate resources:   Identify barriers to discharge with patient and caregiver   Identify discharge learning needs (meds, wound care, etc)

## 2022-11-04 NOTE — CARE COORDINATION
Pt with discharge orders this day. Chart screened by  for discharge planning. No CM needs identified at time of discharge. Milestones met.         11/04/22 1042   Service Assessment   Patient Orientation Alert and Oriented   Cognition Alert   History Provided By Medical Record   Primary Caregiver Self   Support Systems Spouse/Significant Other   PCP Verified by CM Yes   Last Visit to PCP Within last two years   Prior Functional Level Independent in ADLs/IADLs   Current Functional Level Independent in ADLs/IADLs   Can patient return to prior living arrangement Yes   Ability to make needs known: Good   Family able to assist with home care needs: Yes   Condition of Participation: Discharge Planning   The Plan for Transition of Care is related to the following treatment goals: Home with spouse

## 2022-11-04 NOTE — PROGRESS NOTES
[address] FAX: [phone]         VASCULAR SURGERY FLOOR PROGRESS NOTE    Admit Date: 11/3/2022  POD: 1 Day Post-Op    Procedure:  Procedure(s):  RIGHT COMMON FEMORAL ENDARTERECTOMY    Subjective:     Patient has no new complaints and has no complaints. Objective:     Vitals:  Blood pressure 120/68, pulse 68, temperature 97.9 °F (36.6 °C), temperature source Temporal, resp. rate 17, height 5' 9\" (1.753 m), weight 165 lb 4 oz (75 kg), SpO2 96 %. Temp (24hrs), Av.9 °F (36.6 °C), Min:97.7 °F (36.5 °C), Max:98 °F (36.7 °C)      Intake / Output:    Intake/Output Summary (Last 24 hours) at 2022 0644  Last data filed at 11/3/2022 1803  Gross per 24 hour   Intake 1525 ml   Output 425 ml   Net 1100 ml       Physical Exam:    Constitutional: he appears well-developed. No distress. HENT:   Head: Atraumatic. Eyes: Pupils are equal, round, and reactive to light. Neck: Normal range of motion. Cardiovascular: Regular rhythm. Pulmonary/Chest: Effort normal and breath sounds normal. No respiratory distress. Abdominal: Soft. Bowel sounds are normal. he exhibits no distension. There is no tenderness. There is no guarding. No hernia. Musculoskeletal: Normal range of motion. Neurological: He is alert. CN II- XII grossly intact  Vascular: incision intact, foot warm     Labs: No results for input(s): HGB, WBC, K, GLU in the last 72 hours. Invalid input(s):  CREA    Data Review     Assessment:     Patient Active Problem List    Diagnosis Date Noted    PAD (peripheral artery disease) ([facility] [address] 2022    Peripheral arterial disease ([facility] [address] 10/28/2022       Plan/Recommendations/Medical Decision Making:     Plan d/c home follow up 2 weeks    Elements of this note have been dictated using speech recognition software. As a result, errors of speech recognition may have occurred.

## 2022-11-04 NOTE — DISCHARGE INSTRUCTIONS
MD Instructions:   Wound care:   - Wash groin wounds daily with liquid antibacterial soap (for example, Dial); use fingers or soft washcloth gently then pat area dry. - Keep incision / staples clean and dry, may cover with gauze. - Do not apply lotions, creams or ointments to incisions. - Tissue adhesive (\"skin glue\") used over incision will flake or peel off on its own. Diet:   - As tolerated before surgery. Activity:   - Don't overdo your activity throughout the day for the next 10-14 days - no prolonged standing, no lifting more than 8 lb. - Keep legs propped up when not walking.   - Do not drive or drink alcohol while taking narcotics. - Participate in physical therapy / occupational therapy per facility staff. - Starting Saturday you may shower - no tub baths, soaking or swimming. Medication   - Use prescription pain medications if needed; if you do not wish to use narcotic pain medication or require less pain control, you may take acetaminophen (Tylenol, for example) or naproxen (Aleve, for example) following instructions on the label.   - Resume other home medications. ?   Follow up in the office with Dr. Shira Christiansen on ***/2020 at [facility]. If problems or questions arise, please call our office at [phone].

## 2022-11-04 NOTE — PROGRESS NOTES
Discharge instructions, follow up appointments and prescriptions reviewed with patient and family. Both verbalize understanding. All personal belongings taken with patient. Family member will drive patient home. Patient escorted to discharge area via wheelchair. Patient is stable at discharge.       IV removed

## 2022-11-07 ENCOUNTER — TELEPHONE (OUTPATIENT)
Dept: VASCULAR SURGERY | Age: 62
End: 2022-11-07

## 2022-11-07 NOTE — TELEPHONE ENCOUNTER
Pt c/o having knot R-groin (surgery 11-3CFA)  R-knee down to ankle swelling  Pt sent pictures    **per DTW -reviewed pictures he stated wound looks good and use ice    --informed pt of this, call us back if symptoms get worse

## 2022-11-10 ENCOUNTER — TELEPHONE (OUTPATIENT)
Dept: VASCULAR SURGERY | Age: 62
End: 2022-11-10

## 2022-11-10 NOTE — TELEPHONE ENCOUNTER
Pt still c/o R-groin incision knot( getting bigger) wants to be seen sooner than 11-18    --appt made for tomorrow w/ Thierry Carroll NP

## 2022-11-11 ENCOUNTER — OFFICE VISIT (OUTPATIENT)
Dept: VASCULAR SURGERY | Age: 62
End: 2022-11-11

## 2022-11-11 VITALS
DIASTOLIC BLOOD PRESSURE: 84 MMHG | OXYGEN SATURATION: 96 % | BODY MASS INDEX: 24.29 KG/M2 | SYSTOLIC BLOOD PRESSURE: 165 MMHG | HEIGHT: 69 IN | WEIGHT: 164 LBS | HEART RATE: 83 BPM

## 2022-11-11 DIAGNOSIS — I73.9 PAD (PERIPHERAL ARTERY DISEASE) (HCC): Primary | ICD-10-CM

## 2022-11-11 PROCEDURE — 99024 POSTOP FOLLOW-UP VISIT: CPT | Performed by: NURSE PRACTITIONER

## 2022-11-11 RX ORDER — AMOXICILLIN AND CLAVULANATE POTASSIUM 875; 125 MG/1; MG/1
1 TABLET, FILM COATED ORAL 2 TIMES DAILY
Qty: 24 TABLET | Refills: 0 | Status: SHIPPED | OUTPATIENT
Start: 2022-11-11 | End: 2022-11-23

## 2022-11-11 ASSESSMENT — PATIENT HEALTH QUESTIONNAIRE - PHQ9
SUM OF ALL RESPONSES TO PHQ9 QUESTIONS 1 & 2: 0
2. FEELING DOWN, DEPRESSED OR HOPELESS: 0
SUM OF ALL RESPONSES TO PHQ QUESTIONS 1-9: 0
1. LITTLE INTEREST OR PLEASURE IN DOING THINGS: 0
SUM OF ALL RESPONSES TO PHQ QUESTIONS 1-9: 0

## 2022-11-11 NOTE — PROGRESS NOTES
DATE OF VISIT: 11/11/2022      [facility]  Johnna Cooks is a 57 y.o. male who follows up for a wound recheck to his right groin. He denies any N/V/F/D. He noticed a lump to his right groin and reports that it is getting larger and is having some mild discomfort. He is also c/o right leg edema. He underwent a right common femoral endarterectomy with bovine pericardial patch closure by Dr. Sarahy Lee on 11/3/22. MEDICAL HISTORY:   Past Medical History:   Diagnosis Date    Cigarette nicotine dependence     HTN (hypertension)     Hypercholesterolemia     Peripheral arterial disease (HCC)     Prostate enlargement     Right leg pain          SURGICAL HISTORY:   Past Surgical History:   Procedure Laterality Date    COLONOSCOPY      FEMORAL ENDARTERECTOMY Left     FEMORAL ENDARTERECTOMY Right 11/3/2022    RIGHT COMMON FEMORAL ENDARTERECTOMY performed by Miguel Ángel Mata MD at [facility] MAIN OR    KNEE ARTHROSCOPY Bilateral     NECK SURGERY      SHOULDER ARTHROSCOPY Bilateral     TONSILLECTOMY      WISDOM TOOTH EXTRACTION         Review of Systems:  Constitutional:   Negative for fevers and unexplained weight loss. Respiratory:   Negative for hemoptysis. Cardiovascular:   Negative except as noted in HPI. Musculoskeletal:  Negative for active, unexplained/severe joint pain. ALLERGIES: No Known Allergies    CURRENT MEDICATIONS:  Current Outpatient Medications   Medication Sig Dispense Refill    amoxicillin-clavulanate (AUGMENTIN) 875-125 MG per tablet Take 1 tablet by mouth 2 times daily for 12 days 24 tablet 0    rosuvastatin (CRESTOR) 20 MG tablet Take 20 mg by mouth daily      losartan (COZAAR) 50 MG tablet Take 50 mg by mouth daily      tadalafil (CIALIS) 5 MG tablet Take 5 mg by mouth daily       No current facility-administered medications for this visit. IMAGING: Interpretation Summary         No evidence of deep vein thrombosis in the right lower extremity.  Vessels demonstrate normal compressibility, color filling, and phasic and spontaneous flow. There is a complex fluid collection in the right groin measuring 6.7 x 4.0 x 3.6cm. Procedure Details    A gray scale, color Doppler imaging and spectral Doppler analysis ultrasound was performed. During the study longitudinal and transverse views were obtained. Pulsed wave doppler was performed. Overall the study quality was good. Lower Extremity Venous Findings      Right Lower Venous    No evidence of deep vein or superficial vein thrombosis. The common femoral, saphenofemoral junction, profunda femoral, femoral, popliteal,  gastrocnemius, soleal, greater saphenous, and small saphenous veins were imaged in the transverse view and showed normal compressibility. The common femoral, popliteal, and middle femoral veins were imaged in the longitudinal view and showed normal color filling and normal phasic and spontaneous flow. Common Femoral Vein: Patent, normal phasicity, spontaneous, normal augmentation, compressible. Greater Saphenous Vein: Entire vessel patent, compressible  Saphenofemoral Junction: Patent, compressible. Profunda Femoral Vein: Patent, compressible. Femoral Vein: Patent, normal phasicity, spontaneous, normal augmentation, compressible  Popliteal Vein: Patent, normal phasicity, spontaneous, normal augmentation, compressible. Posterior Tibial Vein: Patent, compressible. Peroneal Vein: Patent, compressible.        Right Lower Arterial Measurements     PSV Celso Ratio   CFA Prox 236 cm/s           PFA Prox 58.9 cm/s           SFA Prox 76.3 cm/s        0.3                                    Procedure Staff    Technologist/Clinician: Kalpana Dee  Supporting Staff: None  Performing Physician/Midlevel: None     Exam Completion Date/Time: 11/11/22 12:29 PM      PHYSICAL EXAM  VITALS: BP (!) 165/84 (Site: Right Upper Arm, Position: Sitting, Cuff Size: Medium Adult)   Pulse 83   Ht 5' 9\" (1.753 m)   Wt 164 lb (74.4 kg) SpO2 96%   BMI 24.22 kg/m²       GENERAL: Well developed, well nourished, in NAD  HEAD/NECK: normocephalic, atraumatic, neck supple  HEART: Regular rate and rhythm  ABDOMEN: soft, nontender, nondistended  EXTREMITIES: upper without CCE with palpable radial and brachial pulses. Lower extremities: palpable distal pulses. lump to right groin  MUSCULOSKELETAL: normal gait  NEURO: sensation and strength grossly intact and symmetrical  PSYCH: alert and oriented to person, place and time      Assessment/Plan: Patient is a 57year old male who follows up for a wound recheck to his right groin. He is post right common femoral endarterectomy on 11/3/2022. He does have some edema to his right leg and a lump to his right groin. Ultrasound duplex was performed in the office and is negative for a DVT. He does have a complex fluid collection to his right groin. I am going to prophylactically prescribe Augmentin 875 mg for 12 days. He is to wash the incision line with antibacterial soap and water pat dry. Will keep his regularly scheduled follow-up appt for next week, sooner should any issues arise.          Maggi Galvan, APRN - CNP    20 minutes of time was spent on this encounter including chart review, assessment and evaluation

## 2022-11-18 ENCOUNTER — OFFICE VISIT (OUTPATIENT)
Dept: VASCULAR SURGERY | Age: 62
End: 2022-11-18
Payer: COMMERCIAL

## 2022-11-18 VITALS
HEART RATE: 79 BPM | TEMPERATURE: 98.1 F | HEIGHT: 69 IN | SYSTOLIC BLOOD PRESSURE: 156 MMHG | DIASTOLIC BLOOD PRESSURE: 93 MMHG | WEIGHT: 164 LBS | BODY MASS INDEX: 24.29 KG/M2 | OXYGEN SATURATION: 97 %

## 2022-11-18 DIAGNOSIS — I73.9 PVD (PERIPHERAL VASCULAR DISEASE) WITH CLAUDICATION (HCC): Primary | ICD-10-CM

## 2022-11-18 PROCEDURE — 99213 OFFICE O/P EST LOW 20 MIN: CPT | Performed by: SURGERY

## 2022-11-18 ASSESSMENT — PATIENT HEALTH QUESTIONNAIRE - PHQ9
SUM OF ALL RESPONSES TO PHQ9 QUESTIONS 1 & 2: 0
SUM OF ALL RESPONSES TO PHQ QUESTIONS 1-9: 0
2. FEELING DOWN, DEPRESSED OR HOPELESS: 0
SUM OF ALL RESPONSES TO PHQ QUESTIONS 1-9: 0
1. LITTLE INTEREST OR PLEASURE IN DOING THINGS: 0
SUM OF ALL RESPONSES TO PHQ QUESTIONS 1-9: 0
SUM OF ALL RESPONSES TO PHQ QUESTIONS 1-9: 0

## 2022-11-18 NOTE — PROGRESS NOTES
[address]   [address] [facility] FAX: [phone]    Tami Sandra  : 1960    Chief Complaint:     History of Present Illness:   Patient follows up today for follow-up status post right common femoral endarterectomy. Patient denies any claudication or rest pain symptoms. CURRENT MEDICATIONS:  Current Outpatient Medications   Medication Sig Dispense Refill    amoxicillin-clavulanate (AUGMENTIN) 875-125 MG per tablet Take 1 tablet by mouth 2 times daily for 12 days 24 tablet 0    rosuvastatin (CRESTOR) 20 MG tablet Take 20 mg by mouth daily      losartan (COZAAR) 50 MG tablet Take 50 mg by mouth daily      tadalafil (CIALIS) 5 MG tablet Take 5 mg by mouth daily       No current facility-administered medications for this visit. Past Medical History:   Diagnosis Date    Cigarette nicotine dependence     HTN (hypertension)     Hypercholesterolemia     Peripheral arterial disease (HCC)     Prostate enlargement     Right leg pain        Physical Examination:   Height: 5' 9\" (1.753 m), Weight: 164 lb (74.4 kg), BP: (!) 156/93    Constitutional: he appears well-developed. No distress. HENT:   Head: Atraumatic. Eyes: Pupils are equal, round, and reactive to light. Neck: Normal range of motion. Cardiovascular: Regular rhythm. Pulmonary/Chest: Effort normal and breath sounds normal. No respiratory distress. Abdominal: Soft. Bowel sounds are normal. he exhibits no distension. There is no tenderness. There is no guarding. No hernia. Musculoskeletal: Normal range of motion. Neurological: He is alert. CN II- XII grossly intact   Vascular: Incision intact Doppler pedal pulses    Imaging:          Recommendations/Plans:   Mr. Tami Sandra is a 57y.o. year old male status post right common femoral endarterectomy well-perfused with Doppler signals and follow-up in 6 months with a duplex study.     Luisa Ko MD    Elements of this note have been dictated using speech recognition software. As a result, errors of speech recognition may have occurred.     20 minutes of time was spent on this encounter including chart review, assessment, evaluation and coordination of patient care

## 2023-12-29 ENCOUNTER — PREP FOR PROCEDURE (OUTPATIENT)
Dept: ENT CLINIC | Age: 63
End: 2023-12-29

## 2023-12-29 ENCOUNTER — OFFICE VISIT (OUTPATIENT)
Dept: ENT CLINIC | Age: 63
End: 2023-12-29
Payer: MEDICARE

## 2023-12-29 VITALS — RESPIRATION RATE: 18 BRPM | HEIGHT: 69 IN | WEIGHT: 166 LBS | BODY MASS INDEX: 24.59 KG/M2

## 2023-12-29 DIAGNOSIS — C04.9 SQUAMOUS CELL CARCINOMA OF FLOOR OF MOUTH (HCC): Primary | ICD-10-CM

## 2023-12-29 DIAGNOSIS — C06.9 SQUAMOUS CELL CARCINOMA OF MOUTH (HCC): ICD-10-CM

## 2023-12-29 PROCEDURE — 99204 OFFICE O/P NEW MOD 45 MIN: CPT | Performed by: OTOLARYNGOLOGY

## 2023-12-29 PROCEDURE — 1123F ACP DISCUSS/DSCN MKR DOCD: CPT | Performed by: OTOLARYNGOLOGY

## 2023-12-29 RX ORDER — PRAVASTATIN SODIUM 40 MG
40 TABLET ORAL NIGHTLY
COMMUNITY

## 2023-12-29 RX ORDER — LOSARTAN POTASSIUM 100 MG/1
TABLET ORAL
COMMUNITY
Start: 2023-11-29

## 2023-12-29 NOTE — PROGRESS NOTES
Chief Complaint   Patient presents with    Mouth Lesions     Pt states that he had a lesion on the floor of his mouth and that the biopsy came back as cancer and was referred here. HPI:  Yash Draper is a 70 y.o. male seen today in initial consultation at the request of Dr. Tom Donnelly for newly diagnosed as SCCA of the floor of mouth. He first noticed irritation along the floor mouth back in September 2022. He was traveling out in [place] in his RV and had a cracked tooth. He saw his a local dentist out there and underwent dental extraction. Since then, he has been dealing with some irritation and mild discomfort in that area. He saw his dentist on several occasions, but his exam was clear. More recently, he saw Dr. Tom Donnelly who noted a small ulcer along the left floor of mouth. He underwent incisional biopsy earlier this month which was positive for moderately differentiated SCCA. There was no perineural invasion or lymphovascular invasion, and the depth of invasion was 1.9 mm. He has no previous oral pathology, and he is healed up well since his biopsy. He denies any localized pain, bleeding or purulence currently. No imaging studies yet. Past Medical History, Past Surgical History, Family history, Social History, and Medications were all reviewed with the patient today and updated as necessary. No Known Allergies  Patient Active Problem List   Diagnosis    Peripheral arterial disease (HCC)    PAD (peripheral artery disease) (HCC)    Squamous cell carcinoma of mouth (HCC)     Current Outpatient Medications   Medication Sig    losartan (COZAAR) 100 MG tablet     pravastatin (PRAVACHOL) 40 MG tablet Take 1 tablet by mouth nightly    tadalafil (CIALIS) 5 MG tablet Take 1 tablet by mouth daily     No current facility-administered medications for this visit.      Past Medical History:   Diagnosis Date    Cigarette nicotine dependence     HTN (hypertension)     Hypercholesterolemia     Peripheral

## 2024-01-02 ENCOUNTER — HOSPITAL ENCOUNTER (OUTPATIENT)
Dept: CT IMAGING | Age: 64
Discharge: HOME | End: 2024-01-05
Attending: OTOLARYNGOLOGY
Payer: MEDICARE

## 2024-01-02 DIAGNOSIS — G89.18 POST-OP PAIN: Primary | ICD-10-CM

## 2024-01-02 DIAGNOSIS — C04.9 SQUAMOUS CELL CARCINOMA OF FLOOR OF MOUTH (HCC): ICD-10-CM

## 2024-01-02 LAB — CREAT BLD-MCNC: 0.56 MG/DL (ref 0.8–1.5)

## 2024-01-02 PROCEDURE — 70491 CT SOFT TISSUE NECK W/DYE: CPT

## 2024-01-02 PROCEDURE — 6360000004 HC RX CONTRAST MEDICATION: Performed by: OTOLARYNGOLOGY

## 2024-01-02 PROCEDURE — 82565 ASSAY OF CREATININE: CPT

## 2024-01-02 RX ORDER — HYDROCODONE BITARTRATE AND ACETAMINOPHEN 5; 325 MG/1; MG/1
1 TABLET ORAL EVERY 4 HOURS PRN
Qty: 30 TABLET | Refills: 0 | Status: SHIPPED | OUTPATIENT
Start: 2024-01-02 | End: 2024-01-07

## 2024-01-02 RX ORDER — ONDANSETRON 4 MG/1
4 TABLET, ORALLY DISINTEGRATING ORAL 3 TIMES DAILY PRN
Qty: 10 TABLET | Refills: 0 | Status: SHIPPED | OUTPATIENT
Start: 2024-01-02

## 2024-01-02 RX ORDER — CEPHALEXIN 500 MG/1
500 CAPSULE ORAL 4 TIMES DAILY
Qty: 28 CAPSULE | Refills: 0 | Status: SHIPPED | OUTPATIENT
Start: 2024-01-02

## 2024-01-02 RX ADMIN — IOPAMIDOL 80 ML: 755 INJECTION, SOLUTION INTRAVENOUS at 09:19

## 2024-01-02 NOTE — TELEPHONE ENCOUNTER
I have reviewed the provider's pre-op instructions with the patient, answering all questions to their satisfaction. History & Physical & ROS updated with patient . Post operative medications have been sent to pharmacy and instructions have been given to patient on how to take. Patient informed if they have any questions or concerns to please call the office.

## 2024-01-02 NOTE — PERIOP NOTE
Patient verified name and .  Order for consent  found in EHR and matches case posting; patient verifies procedure.     Type 1B surgery, Phone assessment complete.  Orders  received.  Labs per surgeon: none  Labs per anesthesia protocol: None    Patient answered medical/surgical history questions at their best of ability. All prior to admission medications documented in EPIC.  Patient instructed to take the following medications the day of surgery according to anesthesia guidelines with a small sip of water: none.   Hold all vitamins 7 days prior to surgery and NSAIDS 5 days prior to surgery. Prescription meds to hold:Cialis    Patient instructed on the following:    > Arrive at Main Entrance, time of arrival to be called the day before by 1700  > NPO after midnight, unless otherwise indicated, including gum, mints, and ice chips  > Responsible adult must drive patient to the hospital, stay during surgery, and patient will need supervision 24 hours after anesthesia  > Use non moisturizing soap in shower the night before surgery and on the morning of surgery  > All piercings must be removed prior to arrival.    > Leave all valuables (money and jewelry) at home but bring insurance card and ID on DOS.   > You may be required to pay a deductible or co-pay on the day of your procedure. You can pre-pay by calling [phone] if your surgery is at the [facility] or [phone] if your surgery is at the [facility].  > Do not wear make-up, nail polish, lotions, cologne, perfumes, powders, or oil on skin. Artificial nails are not permitted.

## 2024-01-03 ENCOUNTER — ANESTHESIA EVENT (OUTPATIENT)
Dept: SURGERY | Age: 64
End: 2024-01-03
Payer: MEDICARE

## 2024-01-04 ENCOUNTER — ANESTHESIA (OUTPATIENT)
Dept: SURGERY | Age: 64
End: 2024-01-04
Payer: MEDICARE

## 2024-01-04 ENCOUNTER — HOSPITAL ENCOUNTER (OUTPATIENT)
Age: 64
Setting detail: OUTPATIENT SURGERY
Discharge: HOME | End: 2024-01-04
Attending: OTOLARYNGOLOGY | Admitting: OTOLARYNGOLOGY
Payer: MEDICARE

## 2024-01-04 VITALS
HEIGHT: 69 IN | RESPIRATION RATE: 16 BRPM | DIASTOLIC BLOOD PRESSURE: 73 MMHG | HEART RATE: 79 BPM | OXYGEN SATURATION: 92 % | BODY MASS INDEX: 24.79 KG/M2 | SYSTOLIC BLOOD PRESSURE: 140 MMHG | TEMPERATURE: 98.1 F | WEIGHT: 167.4 LBS

## 2024-01-04 DIAGNOSIS — C06.9 SQUAMOUS CELL CARCINOMA OF MOUTH (HCC): ICD-10-CM

## 2024-01-04 PROCEDURE — 3700000001 HC ADD 15 MINUTES (ANESTHESIA): Performed by: OTOLARYNGOLOGY

## 2024-01-04 PROCEDURE — 3600000002 HC SURGERY LEVEL 2 BASE: Performed by: OTOLARYNGOLOGY

## 2024-01-04 PROCEDURE — 3700000000 HC ANESTHESIA ATTENDED CARE: Performed by: OTOLARYNGOLOGY

## 2024-01-04 PROCEDURE — 6370000000 HC RX 637 (ALT 250 FOR IP): Performed by: ANESTHESIOLOGY

## 2024-01-04 PROCEDURE — 3600000012 HC SURGERY LEVEL 2 ADDTL 15MIN: Performed by: OTOLARYNGOLOGY

## 2024-01-04 PROCEDURE — 2500000003 HC RX 250 WO HCPCS: Performed by: OTOLARYNGOLOGY

## 2024-01-04 PROCEDURE — 7100000010 HC PHASE II RECOVERY - FIRST 15 MIN: Performed by: OTOLARYNGOLOGY

## 2024-01-04 PROCEDURE — 7100000011 HC PHASE II RECOVERY - ADDTL 15 MIN: Performed by: OTOLARYNGOLOGY

## 2024-01-04 PROCEDURE — 2580000003 HC RX 258: Performed by: ANESTHESIOLOGY

## 2024-01-04 PROCEDURE — 6360000002 HC RX W HCPCS

## 2024-01-04 PROCEDURE — 88307 TISSUE EXAM BY PATHOLOGIST: CPT

## 2024-01-04 PROCEDURE — 2500000003 HC RX 250 WO HCPCS

## 2024-01-04 PROCEDURE — 7100000001 HC PACU RECOVERY - ADDTL 15 MIN: Performed by: OTOLARYNGOLOGY

## 2024-01-04 PROCEDURE — 88331 PATH CONSLTJ SURG 1 BLK 1SPC: CPT

## 2024-01-04 PROCEDURE — 2709999900 HC NON-CHARGEABLE SUPPLY: Performed by: OTOLARYNGOLOGY

## 2024-01-04 PROCEDURE — 7100000000 HC PACU RECOVERY - FIRST 15 MIN: Performed by: OTOLARYNGOLOGY

## 2024-01-04 PROCEDURE — 88305 TISSUE EXAM BY PATHOLOGIST: CPT

## 2024-01-04 RX ORDER — SODIUM CHLORIDE 0.9 % (FLUSH) 0.9 %
5-40 SYRINGE (ML) INJECTION EVERY 12 HOURS SCHEDULED
Status: DISCONTINUED | OUTPATIENT
Start: 2024-01-04 | End: 2024-01-04 | Stop reason: HOSPADM

## 2024-01-04 RX ORDER — SODIUM CHLORIDE 0.9 % (FLUSH) 0.9 %
5-40 SYRINGE (ML) INJECTION PRN
Status: DISCONTINUED | OUTPATIENT
Start: 2024-01-04 | End: 2024-01-04 | Stop reason: HOSPADM

## 2024-01-04 RX ORDER — FENTANYL CITRATE 50 UG/ML
INJECTION, SOLUTION INTRAMUSCULAR; INTRAVENOUS PRN
Status: DISCONTINUED | OUTPATIENT
Start: 2024-01-04 | End: 2024-01-04 | Stop reason: SDUPTHER

## 2024-01-04 RX ORDER — SODIUM CHLORIDE, SODIUM LACTATE, POTASSIUM CHLORIDE, CALCIUM CHLORIDE 600; 310; 30; 20 MG/100ML; MG/100ML; MG/100ML; MG/100ML
INJECTION, SOLUTION INTRAVENOUS CONTINUOUS
Status: DISCONTINUED | OUTPATIENT
Start: 2024-01-04 | End: 2024-01-04 | Stop reason: HOSPADM

## 2024-01-04 RX ORDER — NEOSTIGMINE METHYLSULFATE 1 MG/ML
INJECTION, SOLUTION INTRAVENOUS PRN
Status: DISCONTINUED | OUTPATIENT
Start: 2024-01-04 | End: 2024-01-04 | Stop reason: SDUPTHER

## 2024-01-04 RX ORDER — DIPHENHYDRAMINE HYDROCHLORIDE 50 MG/ML
12.5 INJECTION INTRAMUSCULAR; INTRAVENOUS
Status: DISCONTINUED | OUTPATIENT
Start: 2024-01-04 | End: 2024-01-04 | Stop reason: HOSPADM

## 2024-01-04 RX ORDER — MIDAZOLAM HYDROCHLORIDE 2 MG/2ML
2 INJECTION, SOLUTION INTRAMUSCULAR; INTRAVENOUS
Status: DISCONTINUED | OUTPATIENT
Start: 2024-01-04 | End: 2024-01-04 | Stop reason: HOSPADM

## 2024-01-04 RX ORDER — EPHEDRINE SULFATE/0.9% NACL/PF 50 MG/5 ML
SYRINGE (ML) INTRAVENOUS PRN
Status: DISCONTINUED | OUTPATIENT
Start: 2024-01-04 | End: 2024-01-04 | Stop reason: SDUPTHER

## 2024-01-04 RX ORDER — GLYCOPYRROLATE 0.2 MG/ML
INJECTION INTRAMUSCULAR; INTRAVENOUS PRN
Status: DISCONTINUED | OUTPATIENT
Start: 2024-01-04 | End: 2024-01-04 | Stop reason: SDUPTHER

## 2024-01-04 RX ORDER — PHENYLEPHRINE HYDROCHLORIDE 10 MG/ML
INJECTION INTRAVENOUS PRN
Status: DISCONTINUED | OUTPATIENT
Start: 2024-01-04 | End: 2024-01-04 | Stop reason: SDUPTHER

## 2024-01-04 RX ORDER — HYDROMORPHONE HYDROCHLORIDE 2 MG/ML
0.5 INJECTION, SOLUTION INTRAMUSCULAR; INTRAVENOUS; SUBCUTANEOUS EVERY 10 MIN PRN
Status: DISCONTINUED | OUTPATIENT
Start: 2024-01-04 | End: 2024-01-04 | Stop reason: HOSPADM

## 2024-01-04 RX ORDER — MIDAZOLAM HYDROCHLORIDE 1 MG/ML
INJECTION INTRAMUSCULAR; INTRAVENOUS PRN
Status: DISCONTINUED | OUTPATIENT
Start: 2024-01-04 | End: 2024-01-04 | Stop reason: SDUPTHER

## 2024-01-04 RX ORDER — OXYCODONE HYDROCHLORIDE 5 MG/1
5 TABLET ORAL
Status: COMPLETED | OUTPATIENT
Start: 2024-01-04 | End: 2024-01-04

## 2024-01-04 RX ORDER — LIDOCAINE HYDROCHLORIDE 20 MG/ML
INJECTION, SOLUTION EPIDURAL; INFILTRATION; INTRACAUDAL; PERINEURAL PRN
Status: DISCONTINUED | OUTPATIENT
Start: 2024-01-04 | End: 2024-01-04 | Stop reason: SDUPTHER

## 2024-01-04 RX ORDER — SODIUM CHLORIDE 9 MG/ML
INJECTION, SOLUTION INTRAVENOUS PRN
Status: DISCONTINUED | OUTPATIENT
Start: 2024-01-04 | End: 2024-01-04 | Stop reason: HOSPADM

## 2024-01-04 RX ORDER — DEXTROSE MONOHYDRATE 100 MG/ML
INJECTION, SOLUTION INTRAVENOUS CONTINUOUS PRN
Status: DISCONTINUED | OUTPATIENT
Start: 2024-01-04 | End: 2024-01-04 | Stop reason: HOSPADM

## 2024-01-04 RX ORDER — PROCHLORPERAZINE EDISYLATE 5 MG/ML
5 INJECTION INTRAMUSCULAR; INTRAVENOUS
Status: DISCONTINUED | OUTPATIENT
Start: 2024-01-04 | End: 2024-01-04 | Stop reason: HOSPADM

## 2024-01-04 RX ORDER — PROPOFOL 10 MG/ML
INJECTION, EMULSION INTRAVENOUS PRN
Status: DISCONTINUED | OUTPATIENT
Start: 2024-01-04 | End: 2024-01-04 | Stop reason: SDUPTHER

## 2024-01-04 RX ORDER — ESMOLOL HYDROCHLORIDE 10 MG/ML
INJECTION, SOLUTION INTRAVENOUS PRN
Status: DISCONTINUED | OUTPATIENT
Start: 2024-01-04 | End: 2024-01-04 | Stop reason: SDUPTHER

## 2024-01-04 RX ORDER — LIDOCAINE HYDROCHLORIDE 10 MG/ML
1 INJECTION, SOLUTION INFILTRATION; PERINEURAL
Status: DISCONTINUED | OUTPATIENT
Start: 2024-01-04 | End: 2024-01-04 | Stop reason: HOSPADM

## 2024-01-04 RX ORDER — SUCCINYLCHOLINE CHLORIDE 20 MG/ML
INJECTION INTRAMUSCULAR; INTRAVENOUS PRN
Status: DISCONTINUED | OUTPATIENT
Start: 2024-01-04 | End: 2024-01-04 | Stop reason: SDUPTHER

## 2024-01-04 RX ORDER — LIDOCAINE HYDROCHLORIDE AND EPINEPHRINE 10; 10 MG/ML; UG/ML
INJECTION, SOLUTION INFILTRATION; PERINEURAL PRN
Status: DISCONTINUED | OUTPATIENT
Start: 2024-01-04 | End: 2024-01-04 | Stop reason: ALTCHOICE

## 2024-01-04 RX ORDER — ACETAMINOPHEN 500 MG
1000 TABLET ORAL ONCE
Status: COMPLETED | OUTPATIENT
Start: 2024-01-04 | End: 2024-01-04

## 2024-01-04 RX ORDER — ROCURONIUM BROMIDE 10 MG/ML
INJECTION, SOLUTION INTRAVENOUS PRN
Status: DISCONTINUED | OUTPATIENT
Start: 2024-01-04 | End: 2024-01-04 | Stop reason: SDUPTHER

## 2024-01-04 RX ORDER — FENTANYL CITRATE 50 UG/ML
100 INJECTION, SOLUTION INTRAMUSCULAR; INTRAVENOUS
Status: DISCONTINUED | OUTPATIENT
Start: 2024-01-04 | End: 2024-01-04 | Stop reason: HOSPADM

## 2024-01-04 RX ORDER — CLINDAMYCIN PHOSPHATE 600 MG/50ML
INJECTION, SOLUTION INTRAVENOUS PRN
Status: DISCONTINUED | OUTPATIENT
Start: 2024-01-04 | End: 2024-01-04 | Stop reason: SDUPTHER

## 2024-01-04 RX ADMIN — SODIUM CHLORIDE, SODIUM LACTATE, POTASSIUM CHLORIDE, AND CALCIUM CHLORIDE: 600; 310; 30; 20 INJECTION, SOLUTION INTRAVENOUS at 10:31

## 2024-01-04 RX ADMIN — MIDAZOLAM 2 MG: 1 INJECTION INTRAMUSCULAR; INTRAVENOUS at 12:44

## 2024-01-04 RX ADMIN — ROCURONIUM BROMIDE 40 MG: 10 INJECTION, SOLUTION INTRAVENOUS at 13:02

## 2024-01-04 RX ADMIN — ROCURONIUM BROMIDE 10 MG: 10 INJECTION, SOLUTION INTRAVENOUS at 13:20

## 2024-01-04 RX ADMIN — ESMOLOL HYDROCHLORIDE 20 MG: 10 INJECTION INTRAVENOUS at 14:28

## 2024-01-04 RX ADMIN — SODIUM CHLORIDE, SODIUM LACTATE, POTASSIUM CHLORIDE, AND CALCIUM CHLORIDE: 600; 310; 30; 20 INJECTION, SOLUTION INTRAVENOUS at 13:46

## 2024-01-04 RX ADMIN — Medication 5 MG: at 13:58

## 2024-01-04 RX ADMIN — PROPOFOL 200 MG: 10 INJECTION, EMULSION INTRAVENOUS at 12:49

## 2024-01-04 RX ADMIN — Medication 5 MG: at 13:15

## 2024-01-04 RX ADMIN — ACETAMINOPHEN 1000 MG: 500 TABLET ORAL at 10:25

## 2024-01-04 RX ADMIN — FENTANYL CITRATE 100 MCG: 50 INJECTION, SOLUTION INTRAMUSCULAR; INTRAVENOUS at 12:49

## 2024-01-04 RX ADMIN — PHENYLEPHRINE HYDROCHLORIDE 100 MCG: 10 INJECTION INTRAVENOUS at 13:58

## 2024-01-04 RX ADMIN — Medication 10 MG: at 13:46

## 2024-01-04 RX ADMIN — LIDOCAINE HYDROCHLORIDE 100 MG: 20 INJECTION, SOLUTION EPIDURAL; INFILTRATION; INTRACAUDAL; PERINEURAL at 12:49

## 2024-01-04 RX ADMIN — PHENYLEPHRINE HYDROCHLORIDE 100 MCG: 10 INJECTION INTRAVENOUS at 13:15

## 2024-01-04 RX ADMIN — PROPOFOL 50 MG: 10 INJECTION, EMULSION INTRAVENOUS at 13:04

## 2024-01-04 RX ADMIN — PHENYLEPHRINE HYDROCHLORIDE 100 MCG: 10 INJECTION INTRAVENOUS at 12:58

## 2024-01-04 RX ADMIN — ROCURONIUM BROMIDE 10 MG: 10 INJECTION, SOLUTION INTRAVENOUS at 13:50

## 2024-01-04 RX ADMIN — PHENYLEPHRINE HYDROCHLORIDE 100 MCG: 10 INJECTION INTRAVENOUS at 13:46

## 2024-01-04 RX ADMIN — Medication 10 MG: at 13:38

## 2024-01-04 RX ADMIN — Medication 10 MG: at 13:22

## 2024-01-04 RX ADMIN — PHENYLEPHRINE HYDROCHLORIDE 100 MCG: 10 INJECTION INTRAVENOUS at 13:04

## 2024-01-04 RX ADMIN — CLINDAMYCIN PHOSPHATE 600 MG: 600 INJECTION, SOLUTION INTRAVENOUS at 12:56

## 2024-01-04 RX ADMIN — Medication 200 MG: at 12:50

## 2024-01-04 RX ADMIN — PHENYLEPHRINE HYDROCHLORIDE 200 MCG: 10 INJECTION INTRAVENOUS at 13:12

## 2024-01-04 RX ADMIN — GLYCOPYRROLATE 0.8 MG: 0.2 INJECTION INTRAMUSCULAR; INTRAVENOUS at 14:15

## 2024-01-04 RX ADMIN — PROPOFOL 50 MG: 10 INJECTION, EMULSION INTRAVENOUS at 13:01

## 2024-01-04 RX ADMIN — OXYCODONE HYDROCHLORIDE 5 MG: 5 TABLET ORAL at 15:05

## 2024-01-04 RX ADMIN — Medication 5 MG: at 14:15

## 2024-01-04 ASSESSMENT — LIFESTYLE VARIABLES: SMOKING_STATUS: 1

## 2024-01-04 ASSESSMENT — PAIN SCALES - GENERAL
PAINLEVEL_OUTOF10: 4
PAINLEVEL_OUTOF10: 4

## 2024-01-04 ASSESSMENT — PAIN DESCRIPTION - LOCATION: LOCATION: THROAT

## 2024-01-04 ASSESSMENT — PAIN - FUNCTIONAL ASSESSMENT
PAIN_FUNCTIONAL_ASSESSMENT: NONE - DENIES PAIN
PAIN_FUNCTIONAL_ASSESSMENT: 0-10

## 2024-01-04 NOTE — OP NOTE
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    SURGEON:  Juan Way MD    ASSISTANT:  none    ANESTHESIA:  General endotracheal.    ANESTHESIOLOGIST:  David Cavazos MD    COMPLICATIONS:  None.    SPECIMENS REMOVED:  1.  Left floor of mouth lesion - permanent (double long suture anteriorly, single long suture medially, double short suture laterally, single short suture posteriorly).  2.  Medial margin - frozen.  3.  Lateral margin - frozen.  4.  Deep margin - frozen.  5.  Anterior margin - frozen.  6.  Posterior margin - frozen.    IMPLANTS:  none.    ESTIMATED BLOOD LOSS:  5 mL.    OPERATIVE FINDINGS:  1.  There was a well-healed scar along the left floor of mouth and ventral tongue.  Re-excision was performed with 1 cm margins in all directions.  All intraoperative frozen sections were negative for malignancy.  2.  The subsequent defect was able to be closed primarily.    IV FLUIDS:  1300 mL crystalloid.    DRAINS:  None.    DISPOSITION:  PACU then home.    CONDITION:  Stable.    BRIEF HISTORY:  The patient is a 63-year-old male who was referred to my office after a biopsy at his oral surgeon's office was positive for squamous cell carcinoma along the left floor of mouth.  Exam in my office revealed a well-healed scar, and the pathology report revealed a depth of margin of 1.9 mm.  The decision was made to take him back to the operating room for re-excision to clear margins via a left partial glossectomy.    PROCEDURE:  The patient was brought back to the operating room, placed on the table in the supine position.  General endotracheal

## 2024-01-04 NOTE — ANESTHESIA POSTPROCEDURE EVALUATION
Department of Anesthesiology  Postprocedure Note    Patient: Luc Bee  MRN: [id number]  YOB: 1960  Date of evaluation: 1/4/2024    Procedure Summary     Date: 01/04/24 Room / Location: [facility] MAIN OR 08 / [facility] MAIN OR    Anesthesia Start: 1242 Anesthesia Stop: 1431    Procedure: TONGUE RESECTION (GLOSSECTOMY) (Left: Mouth) Diagnosis:       Squamous cell carcinoma of mouth (HCC)      (Squamous cell carcinoma of mouth (HCC) [C06.9])    Providers: Juan Way MD Responsible Provider: Javi Cavazos MD    Anesthesia Type: general ASA Status: 3          Anesthesia Type: No value filed.    Jimenez Phase I: Jimenez Score: 10    Jimenez Phase II: Jimenez Score: 10    Anesthesia Post Evaluation    Patient location during evaluation: PACU  Patient participation: complete - patient participated  Level of consciousness: awake and alert  Airway patency: patent  Nausea: well controlled.  Cardiovascular status: acceptable.  Respiratory status: acceptable  Hydration status: stable  Pain management: adequate        No notable events documented.

## 2024-01-04 NOTE — BRIEF OP NOTE
Brief Postoperative Note      Patient: Luc Bee  YOB: 1960  MRN: [id number]    Date of Procedure: 1/4/2024    Pre-Op Diagnosis Codes:     * Squamous cell carcinoma of mouth (HCC) [C06.9]    Post-Op Diagnosis: Same       Procedure(s):  TONGUE RESECTION (GLOSSECTOMY)    Surgeon(s):  Juan Way MD    Assistant:  * No surgical staff found *    Anesthesia: General    Estimated Blood Loss (mL): Minimal    IVF: 1300 cc    Complications: None    Specimens:   ID Type Source Tests Collected by Time Destination   A : left floor of mouth lesion Tissue Mouth SURGICAL PATHOLOGY Juan Way MD 1/4/2024 1314    B : medial margin Tissue Mouth SURGICAL PATHOLOGY Juan Way MD 1/4/2024 1318    C : lateral margin Tissue Mouth SURGICAL PATHOLOGY Juan Way MD 1/4/2024 1319    D : deep margin Tissue Mouth SURGICAL PATHOLOGY Juan Way MD 1/4/2024 1319    E : anterior margin Tissue Mouth SURGICAL PATHOLOGY Juan Way MD 1/4/2024 1319    F : posterior margin Tissue Mouth SURGICAL PATHOLOGY Juan Way MD 1/4/2024 1319        Implants:  * No implants in log *      Drains: * No LDAs found *    Findings: clear margins, closed primarily      Electronically signed by Juan Way MD on 1/4/2024 at 2:14 PM

## 2024-01-04 NOTE — DISCHARGE INSTRUCTIONS
-Start w/ liquids and advance to soft diet  -No strenuous activity or heavy lifting for 1 wk  -there may be some mild bleeding from mouth for first 24 hrs  -use OTC mouthwash TID to keep oral cavity clear    What To Expect    You will have a small wound in your tongue, and the incision may have stitches.    A small amount of bleeding/oozing is normal for the first 1-2 days.  You may bleed longer if you take a blood thinner.     Wound Care    Avoid acidic or caustic foods (tomato sauce, orange juice, citrus fruits, coffee, and alcohol) for the first few days.    Avoid hard or crunchy foods that require a lot of chewing.     Pain Control    Kermit foods (like yogurt, bread, rice, milk) cause less irritation and less discomfort when eating.    Generally, over the counter acetaminophen (Tylenol®) or ibuprofen (Advil®, Motrin®) will be enough for manage the majority of your pain.  Please take as directed on the bottle.    If your pain is not controlled with other measures please call your doctor.       Recovery    Your mouth tends to heal faster than other parts of your body but allow 3-4 days for pain and discomfort to start to improve.    You may resume your normal daily activities the day after surgery.    No driving if you are taking opioid pain medication. Call Us If…    You develop severe, uncontrolled pain where you had surgery     You experience bleeding that does not stop after putting pressure on the area    The area around the incision or biopsy site becomes inflamed (red, swollen, tender, hot)    You have a fever of 100.4°F (38°C) or higher     After general anesthesia or intravenous sedation, for 24 hours or while taking prescription Narcotics:  Limit your activities  A responsible adult needs to be with you for the next 24 hours  Do not drive and operate hazardous machinery  Do not make important personal or business decisions  Do not drink alcoholic beverages  If you have not urinated within 8 hours after

## 2024-01-04 NOTE — H&P
excision of the left floor mouth to clear margins with frozen section and primary closure.  I reviewed all the risks of surgery including bleeding, infection, wound dehiscence, tongue tethering with speech and swallowing dysfunction, recurrence, and obstruction of the left submandibular gland, and he would like to proceed     HTC

## 2024-01-04 NOTE — ANESTHESIA PRE PROCEDURE
Department of Anesthesiology  Preprocedure Note       Name:  Luc Bee   Age:  63 y.o.  :  1960                                          MRN:  [id number]         Date:  2024      Surgeon: Surgeon(s):  Juan Way MD    Procedure: Procedure(s):  TONGUE RESECTION (GLOSSECTOMY)    Medications prior to admission:   Prior to Admission medications    Medication Sig Start Date End Date Taking? Authorizing Provider   ondansetron (ZOFRAN-ODT) 4 MG disintegrating tablet Take 1 tablet by mouth 3 times daily as needed for Nausea or Vomiting 24   Juan Way MD   HYDROcodone-acetaminophen 2.5-108 mg/5 mL solution Take 15 mLs by mouth 4 times daily as needed for Pain for up to 8 days. Max Daily Amount: 60 mLs 1/2/24 1/10/24  Juan Way MD   cephALEXin (KEFLEX) 500 MG capsule Take 1 capsule by mouth 4 times daily 24   Juan Way MD   HYDROcodone-acetaminophen (NORCO) 5-325 MG per tablet Take 1 tablet by mouth every 4 hours as needed for Pain for up to 5 days. Intended supply: 5 days. Take lowest dose possible to manage pain Max Daily Amount: 6 tablets 24  Juan Way MD   losartan (COZAAR) 100 MG tablet Take 1 tablet by mouth daily 23   Paula Rosales MD   pravastatin (PRAVACHOL) 40 MG tablet Take 1 tablet by mouth daily    Paula Rosales MD   tadalafil (CIALIS) 5 MG tablet Take 1 tablet by mouth daily 22   Paula Rosales MD       Current medications:    Current Facility-Administered Medications   Medication Dose Route Frequency Provider Last Rate Last Admin   • lidocaine 1 % injection 1 mL  1 mL IntraDERmal Once PRN Javi Cavazos MD       • fentaNYL (SUBLIMAZE) injection 100 mcg  100 mcg IntraVENous Once PRN Javi Cavazos MD       • lactated ringers IV soln infusion   IntraVENous Continuous Javi Cavazos  mL/hr at 24 1054 NoRateChange at 24 1054   • sodium chloride flush 0.9 % injection 5-40 mL  5-40 mL

## 2024-01-10 ENCOUNTER — OFFICE VISIT (OUTPATIENT)
Dept: ENT CLINIC | Age: 64
End: 2024-01-10

## 2024-01-10 DIAGNOSIS — C04.9 FLOOR OF MOUTH SQUAMOUS CELL CARCINOMA (HCC): Primary | ICD-10-CM

## 2024-01-10 PROCEDURE — 99024 POSTOP FOLLOW-UP VISIT: CPT | Performed by: OTOLARYNGOLOGY

## 2024-01-10 NOTE — PROGRESS NOTES
Luc Bee is a 63 y.o. male seen today now 1 week post-op after undergoing reexcision of left floor mouth/partial glossectomy back on 1/4/2024.  Doing well overall, but still reports considerable pain.  He is taking him liquids and soft foods only still.  No oral bleeding or purulence.  He has noted some swelling of the left upper neck since surgery.  He does not need any more pain medication    -Left floor mouth healing well, moderate edema as expected.  No wound dehiscence, Vicryl sutures in place.  Moderate swelling of left submandibular gland, but no palpable mass or fluctuance.  Tongue mobility is intact.    PATH:  DIAGNOSIS        A:  \" MEDIAL MARGIN\":         SQUAMOUS EPITHELIUM NEGATIVE FOR MALIGNANT TUMOR          B:  \" LATERAL MARGIN\":         SQUAMOUS EPITHELIUM NEGATIVE FOR MALIGNANT TUMOR          C:  \" DEEP MARGIN\":         CONNECTIVE TISSUE NEGATIVE FOR MALIGNANT TUMOR          D:  \" ANTERIOR MARGIN\":         SQUAMOUS EPITHELIUM NEGATIVE FOR MALIGNANT TUMOR          E:  \" POSTERIOR MARGIN\":         SQUAMOUS EPITHELIUM NEGATIVE FOR MALIGNANT TUMOR          F:  \" LEFT FLOOR OF MOUTH LESION\":         SQUAMOUS EPITHELIUM WITH ASSOCIATED BENIGN GLANDS HAVING FOCAL   FIBROSIS, NEGATIVE FOR MALIGNANT TUMOR     A/P:   Diagnosis Orders   1. 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  There was no residual cancer in his specimen, and all of his margins were clear as well.  This is excellent news.  The incision line is healing up well, and I will leave the Vicryl sutures in place for now.  RTC in 1 week for wound check.    Juan Way MD

## 2024-01-16 ENCOUNTER — OFFICE VISIT (OUTPATIENT)
Dept: ENT CLINIC | Age: 64
End: 2024-01-16

## 2024-01-16 DIAGNOSIS — C04.9 FLOOR OF MOUTH SQUAMOUS CELL CARCINOMA (HCC): Primary | ICD-10-CM

## 2024-01-16 PROCEDURE — 99024 POSTOP FOLLOW-UP VISIT: CPT | Performed by: OTOLARYNGOLOGY

## 2024-01-16 RX ORDER — TRAMADOL HYDROCHLORIDE 50 MG/1
50 TABLET ORAL EVERY 8 HOURS PRN
Qty: 18 TABLET | Refills: 0 | Status: SHIPPED | OUTPATIENT
Start: 2024-01-16 | End: 2024-01-23

## 2024-01-16 NOTE — PROGRESS NOTES
Luc Bee is a 63 y.o. male seen today now almost 2 weeks post-op after undergoing reexcision of left floor mouth/partial glossectomy back on 1/4/2024.  He had an SCCA which was initially biopsied by his oral surgeon and fortunately his reexcision pathology was all clear.  Today, he is doing well with less pain and swelling overall.  He denies any oral bleeding or purulence.  There is much less swelling of his left SMG as well.    -Left floor mouth excision site healing well, residual Vicryl sutures removed.  Mild edema still present but no wound dehiscence and no purulence noted.  Improved swelling of left SMG.  No palpable fluctuance.  No palpable cervical lymphadenopathy    A/P:   Diagnosis Orders   1. 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  His incision is healing well and I trimmed the residual Vicryl sutures today in the office.  There is much less swelling of the left SMG which is great news.  I changed his pain medication to tramadol to see if that helps with less side effects.  RTC in 2 weeks for another wound check.    Juan Way MD

## 2024-01-31 ENCOUNTER — OFFICE VISIT (OUTPATIENT)
Dept: ENT CLINIC | Age: 64
End: 2024-01-31

## 2024-01-31 DIAGNOSIS — C04.9 FLOOR OF MOUTH SQUAMOUS CELL CARCINOMA (HCC): Primary | ICD-10-CM

## 2024-01-31 PROCEDURE — 99024 POSTOP FOLLOW-UP VISIT: CPT | Performed by: OTOLARYNGOLOGY

## 2024-01-31 NOTE — PROGRESS NOTES
Luc Bee is a 63 y.o. male seen today now almost 1 month post-op after undergoing reexcision of left floor mouth/partial glossectomy back on 1/4/2024.  He had an SCCA which was initially biopsied by his oral surgeon and fortunately his reexcision pathology was all clear.  Last seen back on 1/16/2024.  Doing since then with no further pain.  He still reports some numbness along the tip of his tongue.  There is some persistent swelling of the left upper neck near the submandibular gland, but there is no fluctuation in size when he eats/drinks.    -Just mild hypertrophy of left submandibular gland, no fluid collection or hematoma  -No palpable cervical lymphadenopathy  -Left floor mouth and ventral tongue incision is well-healed.  1 residual Vicryl suture removed.  No palpable underlying masses.  No wound dehiscence.      A/P:   Diagnosis Orders   1. 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  I removed the last Vicryl suture today, and everything is healing up well.  I expect his left submandibular gland swelling to resolve over time.  He is leaving tomorrow to go to [place] on an RV trip, and I will see him back in mid April when he returns.    Juan Way MD

## 2024-06-03 ENCOUNTER — OFFICE VISIT (OUTPATIENT)
Dept: ENT CLINIC | Age: 64
End: 2024-06-03
Payer: MEDICARE

## 2024-06-03 VITALS
DIASTOLIC BLOOD PRESSURE: 90 MMHG | SYSTOLIC BLOOD PRESSURE: 156 MMHG | WEIGHT: 166 LBS | HEIGHT: 69 IN | BODY MASS INDEX: 24.59 KG/M2

## 2024-06-03 DIAGNOSIS — K11.20 SIALADENITIS: ICD-10-CM

## 2024-06-03 DIAGNOSIS — C04.9 FLOOR OF MOUTH SQUAMOUS CELL CARCINOMA (HCC): Primary | ICD-10-CM

## 2024-06-03 PROCEDURE — 99213 OFFICE O/P EST LOW 20 MIN: CPT | Performed by: OTOLARYNGOLOGY

## 2024-06-03 PROCEDURE — 1123F ACP DISCUSS/DSCN MKR DOCD: CPT | Performed by: OTOLARYNGOLOGY

## 2024-06-03 RX ORDER — ROSUVASTATIN CALCIUM 20 MG/1
TABLET, COATED ORAL
COMMUNITY
Start: 2024-04-19

## 2024-06-03 ASSESSMENT — ENCOUNTER SYMPTOMS
ABDOMINAL PAIN: 0
SORE THROAT: 0
SHORTNESS OF BREATH: 0

## 2024-06-03 NOTE — PROGRESS NOTES
Use Topics    Alcohol use: Yes     Comment: occasionally     Past Surgical History:   Procedure Laterality Date    COLONOSCOPY      FEMORAL ENDARTERECTOMY Left     FEMORAL ENDARTERECTOMY Right 11/03/2022    RIGHT COMMON FEMORAL ENDARTERECTOMY performed by Javi Richards MD at [facility] MAIN OR    GLOSSECTOMY Left 1/4/2024    TONGUE RESECTION (GLOSSECTOMY) performed by Juan Way MD at [facility] MAIN OR    KNEE ARTHROSCOPY Bilateral     MOUTH FLOOR MASS EXCISION Left 01/04/2024    re-excision of L floor of mouth SCCA- Rayna    NECK SURGERY      SHOULDER ARTHROSCOPY Bilateral     TONSILLECTOMY      WISDOM TOOTH EXTRACTION       Family History   Problem Relation Age of Onset    COPD Mother     Heart Disease Father         CHF    Cancer Father         hx of basal cell CA    Heart Attack Father     Heart Surgery Father     No Known Problems Sister         ROS:    Review of Systems   Constitutional:  Negative for fever.   HENT:  Negative for ear pain and sore throat.    Eyes:  Negative for visual disturbance.   Respiratory:  Negative for shortness of breath.    Cardiovascular:  Negative for chest pain.   Gastrointestinal:  Negative for abdominal pain.   Skin:  Negative for rash.   Neurological:  Negative for dizziness and headaches.   Hematological:  Negative for adenopathy.   Psychiatric/Behavioral:  Negative for agitation.         PHYSICAL EXAM:    BP (!) 156/90 (Site: Left Upper Arm, Position: Sitting)   Ht 1.753 m (5' 9\")   Wt 75.3 kg (166 lb)   BMI 24.51 kg/m²     General: NAD, well-appearing  Neuro: No gross neuro deficits. No facial weakness.  Eyes: No periorbital edema/ecchymosis. No nystagmus.  Skin: No facial erythema, rashes or concerning lesions.  Nose: No external deviations or saddling. Intranasally, septum is midline without perforations, nasal mucosa appears healthy with no erythema, mucopurulence, or polyps.  Mouth: Moist mucus membranes, well-healed left floor mouth excision site with no ulceration,

## 2024-11-18 ENCOUNTER — OFFICE VISIT (OUTPATIENT)
Dept: ENT CLINIC | Age: 64
End: 2024-11-18
Payer: MEDICARE

## 2024-11-18 VITALS — HEIGHT: 69 IN | BODY MASS INDEX: 24.38 KG/M2 | RESPIRATION RATE: 12 BRPM | WEIGHT: 164.6 LBS

## 2024-11-18 DIAGNOSIS — C04.9 SQUAMOUS CELL CARCINOMA OF FLOOR OF MOUTH (HCC): Primary | ICD-10-CM

## 2024-11-18 PROCEDURE — 99213 OFFICE O/P EST LOW 20 MIN: CPT | Performed by: OTOLARYNGOLOGY

## 2024-11-18 PROCEDURE — 1159F MED LIST DOCD IN RCRD: CPT | Performed by: OTOLARYNGOLOGY

## 2024-11-18 PROCEDURE — 1123F ACP DISCUSS/DSCN MKR DOCD: CPT | Performed by: OTOLARYNGOLOGY

## 2024-11-18 ASSESSMENT — ENCOUNTER SYMPTOMS
ALLERGIC/IMMUNOLOGIC NEGATIVE: 1
GASTROINTESTINAL NEGATIVE: 1
EYES NEGATIVE: 1
RESPIRATORY NEGATIVE: 1

## 2024-11-18 NOTE — PROGRESS NOTES
Chief Complaint   Patient presents with    Follow-up     4 mos floor of mouth CA check        HPI:  Luc Bee is a 64 y.o. male seen in follow-up for routine cancer surveillance.  He underwent reexcision of a left floor of mouth SCCA back on 1/4/2024.  He had his initial excision by a local oral surgeon.  His surgical pathology specimen was all fortunately clear.  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  Since his last visit on 6/3/2024, he has been doing well with no oral pain or bleeding.  He denies any problems with speech or mastication.  He has noted less swelling overall of the left upper neck, denies any swelling when he eats/drinks.    Past Medical History, Past Surgical History, Family history, Social History, and Medications were all reviewed with the patient today and updated as necessary.     No Known Allergies  Patient Active Problem List   Diagnosis    Peripheral arterial disease (HCC)    PAD (peripheral artery disease) (HCC)    Squamous cell carcinoma of mouth (HCC)     Current Outpatient Medications   Medication Sig    rosuvastatin (CRESTOR) 20 MG tablet     losartan (COZAAR) 100 MG tablet Take 1 tablet by mouth daily    tadalafil (CIALIS) 5 MG tablet Take 1 tablet by mouth daily     No current facility-administered medications for this visit.     Past Medical History:   Diagnosis Date    Cigarette nicotine dependence     H/O endarterectomy     Left COMMON FEMORAL ENDARTERECTOMY    HTN (hypertension)     Hypercholesterolemia     Peripheral arterial disease (HCC)     Prostate enlargement     Right leg pain     resolved with RIGHT COMMON FEMORAL ENDARTERECTOMY    Squamous cell carcinoma of mouth (HCC)      Social History     Tobacco Use    Smoking status: Every Day     Current packs/day: 1.00     Average packs/day: 1 pack/day for 52.9 years (52.9 ttl pk-yrs)     Types:

## 2025-03-19 NOTE — ADDENDUM NOTE
Addendum  created 11/03/22 1009 by CLAUDIA Palacio CRNA    Child order released for a procedure order, Clinical Note Signed, Intraprocedure Blocks edited, LDA created via procedure documentation, SmartForm saved
Shen

## 2025-03-20 ENCOUNTER — APPOINTMENT (OUTPATIENT)
Dept: PHYSICAL THERAPY | Age: 65
End: 2025-03-20
Payer: MEDICARE

## 2025-03-20 ENCOUNTER — HOSPITAL ENCOUNTER (OUTPATIENT)
Dept: PHYSICAL THERAPY | Age: 65
Setting detail: RECURRING SERIES
Discharge: HOME | End: 2025-03-23
Payer: MEDICARE

## 2025-03-20 DIAGNOSIS — G89.29 CHRONIC LEFT SHOULDER PAIN: Primary | ICD-10-CM

## 2025-03-20 DIAGNOSIS — M25.512 CHRONIC LEFT SHOULDER PAIN: Primary | ICD-10-CM

## 2025-03-20 DIAGNOSIS — R29.898 WEAKNESS OF LEFT SHOULDER: ICD-10-CM

## 2025-03-20 DIAGNOSIS — M54.2 CERVICALGIA: ICD-10-CM

## 2025-03-20 PROCEDURE — 97110 THERAPEUTIC EXERCISES: CPT

## 2025-03-20 PROCEDURE — 97162 PT EVAL MOD COMPLEX 30 MIN: CPT

## 2025-03-20 PROCEDURE — 97140 MANUAL THERAPY 1/> REGIONS: CPT

## 2025-03-20 ASSESSMENT — PAIN SCALES - GENERAL: PAINLEVEL_OUTOF10: 0

## 2025-03-20 NOTE — PROGRESS NOTES
Luc Bee  : 1960  Primary: [facility] [employer] Medicare Advantage (Medicare Managed)  Secondary:  [facility] [place] [address]  Phone: [phone]  Fax: [phone] Plan Frequency: 1-3 sessions per week    Plan of Care/Certification Expiration Date: 25        Plan of Care/Certification Expiration Date:  Plan of Care/Certification Expiration Date: 25    Frequency/Duration: Plan Frequency: 1-3 sessions per week      Time In/Out:   Time In: 930  Time Out: 1015      PT Visit Info:         Visit Count:  1    OUTPATIENT PHYSICAL THERAPY:   Treatment Note 3/20/2025       Episode  (Therapy evaluation)               Treatment Diagnosis:    Chronic left shoulder pain  Weakness of left shoulder  Cervicalgia  Medical/Referring Diagnosis:      Chronic left shoulder pain (M25.512, G89.29)   Referring Physician:  Baumgarten, Thomas E, MD MD Orders:  PT Eval and Treat   Return MD Appt:     Date of Onset:  Onset Date: 25     Allergies:   Patient has no known allergies.  Restrictions/Precautions:   Post Surgical Precautions: see evals      Interventions Planned (Treatment may consist of any combination of the following):     See Assessment Note    Subjective Comments:   See eval  Initial Pain Level:     0/10  Post Session Pain Level:      0/10  Medications Last Reviewed: 3/20/2025  Updated Objective Findings:  See Evaluation Note from today  Treatment   TREATMENT:   THERAPEUTIC ACTIVITY: ( see below for minutes): Therapeutic activities per grid below to improve mobility, strength, balance and coordination. Required minimal visual, verbal, manual and tactile cues to improve independence and safety with daily activities .  THERAPEUTIC EXERCISE: (see below for minutes): Exercises per grid below to improve mobility, strength, balance and coordination. Required minimal verbal and manual cues to promote proper body alignment, promote proper body posture and

## 2025-03-20 NOTE — THERAPY EVALUATION
Luc Bee  : 1960  Primary: [facility] [employer] Medicare Advantage (Medicare Managed)  Secondary:  [facility] [place] [address]  Phone: [phone]  Fax: [phone] Plan Frequency: 1-3 sessions per week    Plan of Care/Certification Expiration Date: 25        Plan of Care/Certification Expiration Date:  Plan of Care/Certification Expiration Date: 25    Frequency/Duration: Plan Frequency: 1-3 sessions per week      Time In/Out:   Time In: 930  Time Out: 1015      PT Visit Info:         Visit Count:  1                OUTPATIENT PHYSICAL THERAPY:             Initial Assessment 3/20/2025               Episode (Therapy evaluation)         Treatment Diagnosis:     Chronic left shoulder pain  Weakness of left shoulder  Cervicalgia  Medical/Referring Diagnosis:      Chronic left shoulder pain (M25.512, G89.29)  Referring Physician:  Baumgarten, Thomas E, MD MD Orders:  PT Eval and Treat   Return MD Appt:  TBD  Date of Onset:  Onset Date: 25   (*surgical date 3/13/25 - initially the pain began 1 year ago)  Allergies:  Patient has no known allergies.  Restrictions/Precautions:    Post Surgical Precautions: **communicating with surgeon concerning surgical precautions as well as surgical notes**      Medications Last Reviewed: 3/20/2025     SUBJECTIVE   History of Injury/Illness (Reason for Referral):  Patient is a pleasant 65 yr old male who arrives to clinic post op LEFT SHOULDER SCOPE ROTATOR CUFF REPAIR performed on 3/13/25 by Dr. Baumgarten. Patient notes at rest his pain is a 0/10. He notes that the pain was an 8/10 the first 48 hours after surgery but has significantly improved. He arrives wearing a sling without abduction pillow today. Patient notes that the pillow was causing significant bruising and irritation of the medial arm - therefore, he is utilizing a softer pillow to support the arm in sitting positions. Patient and his

## 2025-03-25 ENCOUNTER — HOSPITAL ENCOUNTER (OUTPATIENT)
Dept: PHYSICAL THERAPY | Age: 65
Setting detail: RECURRING SERIES
Discharge: HOME | End: 2025-03-28
Payer: MEDICARE

## 2025-03-25 PROCEDURE — 97110 THERAPEUTIC EXERCISES: CPT

## 2025-03-25 NOTE — PROGRESS NOTES
Luc Bee  : 1960  Primary: [facility] [employer] Medicare Advantage (Medicare Managed)  Secondary:  [facility] [place] [address]  Phone: [phone]  Fax: [phone] Plan Frequency: 1-3 sessions per week    Plan of Care/Certification Expiration Date: 25        Plan of Care/Certification Expiration Date:  Plan of Care/Certification Expiration Date: 25    Frequency/Duration: Plan Frequency: 1-3 sessions per week      Time In/Out:   Time In: 1015  Time Out: 1055      PT Visit Info:         Visit Count:  2    OUTPATIENT PHYSICAL THERAPY:   Treatment Note 3/25/2025       Episode  (Therapy evaluation)               Treatment Diagnosis:    Chronic left shoulder pain  Weakness of left shoulder  Cervicalgia  Medical/Referring Diagnosis:      Chronic left shoulder pain (M25.512, G89.29)   Referring Physician:  Baumgarten, Thomas E, MD MD Orders:  PT Eval and Treat   Return MD Appt:     Date of Onset:  Onset Date: 25     Allergies:   Patient has no known allergies.  Restrictions/Precautions:   Post Surgical Precautions: see evals      Interventions Planned (Treatment may consist of any combination of the following):     See Assessment Note    Subjective Comments:   Pt reports that his shoulder has done well and he continues to keep arm in sling.   Initial Pain Level:      0/10  Post Session Pain Level:       0/10  Medications Last Reviewed: 3/25/2025  Updated Objective Findings:  See Evaluation Note from today  Treatment   TREATMENT:   THERAPEUTIC ACTIVITY: ( see below for minutes): Therapeutic activities per grid below to improve mobility, strength, balance and coordination. Required minimal visual, verbal, manual and tactile cues to improve independence and safety with daily activities .  THERAPEUTIC EXERCISE: (see below for minutes): Exercises per grid below to improve mobility, strength, balance and coordination. Required minimal verbal and

## 2025-03-27 ENCOUNTER — HOSPITAL ENCOUNTER (OUTPATIENT)
Dept: PHYSICAL THERAPY | Age: 65
Setting detail: RECURRING SERIES
Discharge: HOME | End: 2025-03-30
Payer: MEDICARE

## 2025-03-27 PROCEDURE — 97110 THERAPEUTIC EXERCISES: CPT

## 2025-03-27 NOTE — PROGRESS NOTES
Luc Bee  : 1960  Primary: [facility] [employer] Medicare Advantage (Medicare Managed)  Secondary:  [facility] [place] [address]  Phone: [phone]  Fax: [phone] Plan Frequency: 1-3 sessions per week    Plan of Care/Certification Expiration Date: 25        Plan of Care/Certification Expiration Date:  Plan of Care/Certification Expiration Date: 25    Frequency/Duration: Plan Frequency: 1-3 sessions per week      Time In/Out:   Time In: 1020  Time Out: 1105      PT Visit Info:         Visit Count:  3    OUTPATIENT PHYSICAL THERAPY:   Treatment Note 3/27/2025       Episode  (Therapy evaluation)               Treatment Diagnosis:    Chronic left shoulder pain  Weakness of left shoulder  Cervicalgia  Medical/Referring Diagnosis:      Chronic left shoulder pain (M25.512, G89.29)   Referring Physician:  Baumgarten, Thomas E, MD MD Orders:  PT Eval and Treat   Return MD Appt:   25  Date of Onset:  Onset Date: 25     Allergies:   Patient has no known allergies.  Restrictions/Precautions:   Post Surgical Precautions: see evals      Interventions Planned (Treatment may consist of any combination of the following):     See Assessment Note    Subjective Comments:   Pt saw MD yesterday who removed sutures. He reported that MD says he is doing well and to continue PT. He can wean from sling in 1 week.   Initial Pain Level:      0/10  Post Session Pain Level:       0/10  Medications Last Reviewed: 3/27/2025  Updated Objective Findings:  See Evaluation Note from today  Treatment   TREATMENT:   THERAPEUTIC ACTIVITY: ( see below for minutes): Therapeutic activities per grid below to improve mobility, strength, balance and coordination. Required minimal visual, verbal, manual and tactile cues to improve independence and safety with daily activities .  THERAPEUTIC EXERCISE: (see below for minutes): Exercises per grid below to improve mobility,

## 2025-04-01 ENCOUNTER — HOSPITAL ENCOUNTER (OUTPATIENT)
Dept: PHYSICAL THERAPY | Age: 65
Setting detail: RECURRING SERIES
Discharge: HOME | End: 2025-04-04
Payer: MEDICARE

## 2025-04-01 PROCEDURE — 97110 THERAPEUTIC EXERCISES: CPT

## 2025-04-01 NOTE — PROGRESS NOTES
Luc Bee  : 1960  Primary: [facility] [employer] Medicare Advantage (Medicare Managed)  Secondary:  [facility] [place] [address]  Phone: [phone]  Fax: [phone] Plan Frequency: 1-3 sessions per week    Plan of Care/Certification Expiration Date: 25        Plan of Care/Certification Expiration Date:  Plan of Care/Certification Expiration Date: 25    Frequency/Duration: Plan Frequency: 1-3 sessions per week      Time In/Out:   Time In: 1015  Time Out: 1055      PT Visit Info:         Visit Count:  4    OUTPATIENT PHYSICAL THERAPY:   Treatment Note 2025       Episode  (Therapy evaluation)               Treatment Diagnosis:    Chronic left shoulder pain  Weakness of left shoulder  Cervicalgia  Medical/Referring Diagnosis:      Chronic left shoulder pain (M25.512, G89.29)   Referring Physician:  Baumgarten, Thomas E, MD MD Orders:  PT Eval and Treat   Return MD Appt:   25  Date of Onset:  Onset Date: 25     Allergies:   Patient has no known allergies.  Restrictions/Precautions:   Post Surgical Precautions: see evals      Interventions Planned (Treatment may consist of any combination of the following):     See Assessment Note    Subjective Comments: Pt reports his shoulder has continued to do well and he has followed his surgical precautions.  Initial Pain Level:      0/10  Post Session Pain Level:       0/10  Medications Last Reviewed: 2025  Updated Objective Findings:  see evaluation from 3/20/25  Treatment   TREATMENT:   THERAPEUTIC ACTIVITY: ( see below for minutes): Therapeutic activities per grid below to improve mobility, strength, balance and coordination. Required minimal visual, verbal, manual and tactile cues to improve independence and safety with daily activities .  THERAPEUTIC EXERCISE: (see below for minutes): Exercises per grid below to improve mobility, strength, balance and coordination. Required minimal

## 2025-04-03 ENCOUNTER — HOSPITAL ENCOUNTER (OUTPATIENT)
Dept: PHYSICAL THERAPY | Age: 65
Setting detail: RECURRING SERIES
Discharge: HOME | End: 2025-04-06
Payer: MEDICARE

## 2025-04-03 PROCEDURE — 97110 THERAPEUTIC EXERCISES: CPT

## 2025-04-03 NOTE — PROGRESS NOTES
Luc Bee  : 1960  Primary: [facility] [employer] Medicare Advantage (Medicare Managed)  Secondary:  [facility] [place] [address]  Phone: [phone]  Fax: [phone] Plan Frequency: 1-3 sessions per week    Plan of Care/Certification Expiration Date: 25        Plan of Care/Certification Expiration Date:  Plan of Care/Certification Expiration Date: 25    Frequency/Duration: Plan Frequency: 1-3 sessions per week      Time In/Out:   Time In: 1015  Time Out: 1100      PT Visit Info:         Visit Count:  5    OUTPATIENT PHYSICAL THERAPY:   Treatment Note 4/3/2025       Episode  (Therapy evaluation)               Treatment Diagnosis:    Chronic left shoulder pain  Weakness of left shoulder  Cervicalgia  Medical/Referring Diagnosis:      Chronic left shoulder pain (M25.512, G89.29)   Referring Physician:  Baumgarten, Thomas E, MD MD Orders:  PT Eval and Treat   Return MD Appt:   25  Date of Onset:  Onset Date: 25     Allergies:   Patient has no known allergies.  Restrictions/Precautions:   Post Surgical Precautions: see evals      Interventions Planned (Treatment may consist of any combination of the following):     See Assessment Note    Subjective Comments: Pt reports he started weaning from the sling and it has gone well. He slept with no sling last night and had no pain from that. He confirms understanding of precautions even when out of sling.   Initial Pain Level:      0/10  Post Session Pain Level:       0/10  Medications Last Reviewed: 4/3/2025  Updated Objective Findings:  see evaluation from 3/20/25  Treatment   TREATMENT:   THERAPEUTIC ACTIVITY: ( see below for minutes): Therapeutic activities per grid below to improve mobility, strength, balance and coordination. Required minimal visual, verbal, manual and tactile cues to improve independence and safety with daily activities .  THERAPEUTIC EXERCISE: (see below for minutes):

## 2025-04-08 ENCOUNTER — APPOINTMENT (OUTPATIENT)
Dept: PHYSICAL THERAPY | Age: 65
End: 2025-04-08
Payer: MEDICARE

## 2025-04-10 ENCOUNTER — APPOINTMENT (OUTPATIENT)
Dept: PHYSICAL THERAPY | Age: 65
End: 2025-04-10
Payer: MEDICARE

## 2025-04-15 ENCOUNTER — HOSPITAL ENCOUNTER (OUTPATIENT)
Dept: PHYSICAL THERAPY | Age: 65
Setting detail: RECURRING SERIES
Discharge: HOME | End: 2025-04-18
Payer: MEDICARE

## 2025-04-15 PROCEDURE — 97110 THERAPEUTIC EXERCISES: CPT

## 2025-04-15 NOTE — PROGRESS NOTES
Luc Bee  : 1960  Primary: [facility] [employer] Medicare Advantage (Medicare Managed)  Secondary:  [facility] [place] [address]  Phone: [phone]  Fax: [phone] Plan Frequency: 1-3 sessions per week    Plan of Care/Certification Expiration Date: 25        Plan of Care/Certification Expiration Date:  Plan of Care/Certification Expiration Date: 25    Frequency/Duration: Plan Frequency: 1-3 sessions per week      Time In/Out:   Time In: 1015  Time Out: 1100      PT Visit Info:         Visit Count:  6    OUTPATIENT PHYSICAL THERAPY:   Treatment Note 4/15/2025       Episode  (Therapy evaluation)               Treatment Diagnosis:    Chronic left shoulder pain  Weakness of left shoulder  Cervicalgia  Medical/Referring Diagnosis:      Chronic left shoulder pain (M25.512, G89.29)   Referring Physician:  Baumgarten, Thomas E, MD MD Orders:  PT Eval and Treat   Return MD Appt:   25  Date of Onset:  Onset Date: 25     Allergies:   Patient has no known allergies.  Restrictions/Precautions:   Post Surgical Precautions: see evals      Interventions Planned (Treatment may consist of any combination of the following):     See Assessment Note    Subjective Comments: Pt reports that he has continued his exercises and they've gone well. He's fully weaned from the sling and reported that has not been a problem..   Initial Pain Level:      0/10  Post Session Pain Level:       0/10  Medications Last Reviewed: 4/15/2025  Updated Objective Findings:  see evaluation from 3/20/25  Treatment   TREATMENT:   THERAPEUTIC ACTIVITY: ( see below for minutes): Therapeutic activities per grid below to improve mobility, strength, balance and coordination. Required minimal visual, verbal, manual and tactile cues to improve independence and safety with daily activities .  THERAPEUTIC EXERCISE: (see below for minutes): Exercises per grid below to improve mobility,

## 2025-04-16 ENCOUNTER — OFFICE VISIT (OUTPATIENT)
Dept: ENT CLINIC | Age: 65
End: 2025-04-16
Payer: MEDICARE

## 2025-04-16 VITALS — RESPIRATION RATE: 10 BRPM | WEIGHT: 167 LBS | HEIGHT: 69 IN | BODY MASS INDEX: 24.73 KG/M2

## 2025-04-16 DIAGNOSIS — C04.9 SQUAMOUS CELL CARCINOMA OF FLOOR OF MOUTH: Primary | ICD-10-CM

## 2025-04-16 PROCEDURE — 99213 OFFICE O/P EST LOW 20 MIN: CPT | Performed by: OTOLARYNGOLOGY

## 2025-04-16 PROCEDURE — 1123F ACP DISCUSS/DSCN MKR DOCD: CPT | Performed by: OTOLARYNGOLOGY

## 2025-04-16 PROCEDURE — 1159F MED LIST DOCD IN RCRD: CPT | Performed by: OTOLARYNGOLOGY

## 2025-04-16 ASSESSMENT — ENCOUNTER SYMPTOMS
GASTROINTESTINAL NEGATIVE: 1
ALLERGIC/IMMUNOLOGIC NEGATIVE: 1
RESPIRATORY NEGATIVE: 1
EYES NEGATIVE: 1

## 2025-04-16 NOTE — PROGRESS NOTES
Chief Complaint   Patient presents with    Follow-up     Follow up Ca mouth       HPI:  Luc Bee is a 65 y.o. male seen in follow-up for routine cancer surveillance.  He underwent reexcision of a T1 left floor of mouth SCCA back on 1/4/2024.  He had his initial excision by a local oral surgeon.  His surgical pathology specimen was all fortunately clear.  Last seen back on 11/18/2024.  Doing great since then with no oral pain or bleeding.  He denies any problems with speech or mastication.  He had been dealing with some intermittent swelling of his left submandibular gland immediately after surgery which has since resolved.  No other new ENT complaints.    Past Medical History, Past Surgical History, Family history, Social History, and Medications were all reviewed with the patient today and updated as necessary.     No Known Allergies  Patient Active Problem List   Diagnosis    Peripheral arterial disease    PAD (peripheral artery disease)    Squamous cell carcinoma of mouth     Current Outpatient Medications   Medication Sig    rosuvastatin (CRESTOR) 20 MG tablet     losartan (COZAAR) 100 MG tablet Take 1 tablet by mouth daily    tadalafil (CIALIS) 5 MG tablet Take 1 tablet by mouth daily     No current facility-administered medications for this visit.     Past Medical History:   Diagnosis Date    Cigarette nicotine dependence     H/O endarterectomy     Left COMMON FEMORAL ENDARTERECTOMY    HTN (hypertension)     Hypercholesterolemia     Peripheral arterial disease     Prostate enlargement     Right leg pain     resolved with RIGHT COMMON FEMORAL ENDARTERECTOMY    Squamous cell carcinoma of mouth      Social History     Tobacco Use    Smoking status: Every Day     Current packs/day: 1.00     Average packs/day: 1 pack/day for 53.3 years (53.3 ttl pk-yrs)     Types: Cigarettes     Start date: 1972    Smokeless tobacco: Never   Substance Use Topics    Alcohol use: Yes     Comment: occasionally     Past

## 2025-04-17 ENCOUNTER — HOSPITAL ENCOUNTER (OUTPATIENT)
Dept: PHYSICAL THERAPY | Age: 65
Setting detail: RECURRING SERIES
Discharge: HOME | End: 2025-04-20
Payer: MEDICARE

## 2025-04-17 PROCEDURE — 97110 THERAPEUTIC EXERCISES: CPT

## 2025-04-17 NOTE — PROGRESS NOTES
Luc Bee  : 1960  Primary: [facility] [employer] Medicare Advantage (Medicare Managed)  Secondary:  [facility] [place] [address]  Phone: [phone]  Fax: [phone] Plan Frequency: 1-3 sessions per week    Plan of Care/Certification Expiration Date: 25        Plan of Care/Certification Expiration Date:  Plan of Care/Certification Expiration Date: 25    Frequency/Duration: Plan Frequency: 1-3 sessions per week      Time In/Out:   Time In: 1015  Time Out: 1057      PT Visit Info:         Visit Count:  7    OUTPATIENT PHYSICAL THERAPY:   Treatment and progress Note 2025       Episode  (Therapy evaluation)               Treatment Diagnosis:    Chronic left shoulder pain  Weakness of left shoulder  Cervicalgia  Medical/Referring Diagnosis:      Chronic left shoulder pain (M25.512, G89.29)   Referring Physician:  Baumgarten, Thomas E, MD MD Orders:  PT Eval and Treat   Return MD Appt:   25  Date of Onset:  Onset Date: 25     Allergies:   Patient has no known allergies.  Restrictions/Precautions:   Post Surgical Precautions: see evals      Interventions Planned (Treatment may consist of any combination of the following):     See Assessment Note    Subjective Comments: Pt reports that his shoulder continues to improve and he's been cautious with it.   Initial Pain Level:      10  Post Session Pain Level:       1/10  Medications Last Reviewed: 2025  Updated Objective Findings:  see evaluation from 3/20/25    4/17/25:   L shoulder passive flexion: 133  L shoulder passive abduction: 105  L shoulder passive ER: 63  L shoulder active flexion: 133  L shoulder active abduction: 106  L shoulder active ER: 50    Goals: (Goals have been discussed and agreed upon with patient.)  Short-Term Functional Goals: Time Frame: 6 weeks  Patient will achieve 120 deg of passive shoulder flexion. MET  Patient will achieve 90 deg of passive shoulder

## 2025-04-22 ENCOUNTER — HOSPITAL ENCOUNTER (OUTPATIENT)
Dept: PHYSICAL THERAPY | Age: 65
Setting detail: RECURRING SERIES
Discharge: HOME | End: 2025-04-25
Payer: MEDICARE

## 2025-04-22 PROCEDURE — 97110 THERAPEUTIC EXERCISES: CPT

## 2025-04-22 NOTE — PROGRESS NOTES
Luc Bee  : 1960  Primary: [facility] [employer] Medicare Advantage (Medicare Managed)  Secondary:  [facility] [place] [address]  Phone: [phone]  Fax: [phone] Plan Frequency: 1-3 sessions per week    Plan of Care/Certification Expiration Date: 25        Plan of Care/Certification Expiration Date:  Plan of Care/Certification Expiration Date: 25    Frequency/Duration: Plan Frequency: 1-3 sessions per week      Time In/Out:   Time In: 1015  Time Out: 1058      PT Visit Info:         Visit Count:  8    OUTPATIENT PHYSICAL THERAPY:   Treatment Note 2025       Episode  (Therapy evaluation)               Treatment Diagnosis:    Chronic left shoulder pain  Weakness of left shoulder  Cervicalgia  Medical/Referring Diagnosis:      Chronic left shoulder pain (M25.512, G89.29)   Referring Physician:  Baumgarten, Thomas E, MD MD Orders:  PT Eval and Treat   Return MD Appt:   25  Date of Onset:  Onset Date: 25     Allergies:   Patient has no known allergies.  Restrictions/Precautions:   Post Surgical Precautions: see evals      Interventions Planned (Treatment may consist of any combination of the following):     See Assessment Note    Subjective Comments: Pt reports that he has been using his L shoulder for light activities, but continues to be cautious with it.   Initial Pain Level:      0/10  Post Session Pain Level:       0/10  Medications Last Reviewed: 2025  Updated Objective Findings:  see evaluation from 3/20/25    4/17/25:   L shoulder passive flexion: 133  L shoulder passive abduction: 105  L shoulder passive ER: 63  L shoulder active flexion: 133  L shoulder active abduction: 106  L shoulder active ER: 50    Goals: (Goals have been discussed and agreed upon with patient.)  Short-Term Functional Goals: Time Frame: 6 weeks  Patient will achieve 120 deg of passive shoulder flexion. MET  Patient will achieve 90 deg of

## 2025-04-24 ENCOUNTER — HOSPITAL ENCOUNTER (OUTPATIENT)
Dept: PHYSICAL THERAPY | Age: 65
Setting detail: RECURRING SERIES
Discharge: HOME | End: 2025-04-27
Payer: MEDICARE

## 2025-04-24 PROCEDURE — 97110 THERAPEUTIC EXERCISES: CPT

## 2025-04-24 NOTE — PROGRESS NOTES
Luc Bee  : 1960  Primary: [facility] [employer] Medicare Advantage (Medicare Managed)  Secondary:  [facility] [place] [address]  Phone: [phone]  Fax: [phone] Plan Frequency: 1-3 sessions per week    Plan of Care/Certification Expiration Date: 25        Plan of Care/Certification Expiration Date:  Plan of Care/Certification Expiration Date: 25    Frequency/Duration: Plan Frequency: 1-3 sessions per week      Time In/Out:   Time In: 1015  Time Out: 1100      PT Visit Info:         Visit Count:  9    OUTPATIENT PHYSICAL THERAPY:   Treatment Note 2025       Episode  (Therapy evaluation)               Treatment Diagnosis:    Chronic left shoulder pain  Weakness of left shoulder  Cervicalgia  Medical/Referring Diagnosis:      Chronic left shoulder pain (M25.512, G89.29)   Referring Physician:  Baumgarten, Thomas E, MD MD Orders:  PT Eval and Treat   Return MD Appt:   25  Date of Onset:  Onset Date: 25     Allergies:   Patient has no known allergies.  Restrictions/Precautions:   Post Surgical Precautions: see evals      Interventions Planned (Treatment may consist of any combination of the following):     See Assessment Note    Subjective Comments: Pt saw PA yesterday for shoulder follow-up. He said PA was very pleased with his progress and said he does not need to follow-up in the office unless he runs into any problems.   Initial Pain Level:      0/10  Post Session Pain Level:       0/10  Medications Last Reviewed: 2025  Updated Objective Findings:  see evaluation from 3/20/25    4/17/25:   L shoulder passive flexion: 133  L shoulder passive abduction: 105  L shoulder passive ER: 63  L shoulder active flexion: 133  L shoulder active abduction: 106  L shoulder active ER: 50    Goals: (Goals have been discussed and agreed upon with patient.)  Short-Term Functional Goals: Time Frame: 6 weeks  Patient will achieve 120 deg

## 2025-04-29 ENCOUNTER — HOSPITAL ENCOUNTER (OUTPATIENT)
Dept: PHYSICAL THERAPY | Age: 65
Setting detail: RECURRING SERIES
Discharge: HOME | End: 2025-05-02
Payer: MEDICARE

## 2025-04-29 PROCEDURE — 97110 THERAPEUTIC EXERCISES: CPT

## 2025-04-29 NOTE — PROGRESS NOTES
Luc Bee  : 1960  Primary: [facility] [employer] Medicare Advantage (Medicare Managed)  Secondary:  [facility] [place] [address]  Phone: [phone]  Fax: [phone] Plan Frequency: 1-3 sessions per week    Plan of Care/Certification Expiration Date: 25        Plan of Care/Certification Expiration Date:  Plan of Care/Certification Expiration Date: 25    Frequency/Duration: Plan Frequency: 1-3 sessions per week      Time In/Out:   Time In: 1015  Time Out: 1055      PT Visit Info:         Visit Count:  10    OUTPATIENT PHYSICAL THERAPY:   Treatment Note 2025       Episode  (Therapy evaluation)               Treatment Diagnosis:    Chronic left shoulder pain  Weakness of left shoulder  Cervicalgia  Medical/Referring Diagnosis:      Chronic left shoulder pain (M25.512, G89.29)   Referring Physician:  Baumgarten, Thomas E, MD MD Orders:  PT Eval and Treat   Return MD Appt:   25  Date of Onset:  Onset Date: 25     Allergies:   Patient has no known allergies.  Restrictions/Precautions:   Post Surgical Precautions: see evals      Interventions Planned (Treatment may consist of any combination of the following):     See Assessment Note    Subjective Comments: Pt reports he expected to be sore after last session but was not. He reports he has begun to use his arm for more ADLs cautiously.   Initial Pain Level:      0/10  Post Session Pain Level:       0/10  Medications Last Reviewed: 2025  Updated Objective Findings:  see evaluation from 3/20/25    4/17/25:   L shoulder passive flexion: 133  L shoulder passive abduction: 105  L shoulder passive ER: 63  L shoulder active flexion: 133  L shoulder active abduction: 106  L shoulder active ER: 50    Goals: (Goals have been discussed and agreed upon with patient.)  Short-Term Functional Goals: Time Frame: 6 weeks  Patient will achieve 120 deg of passive shoulder flexion. MET  Patient

## 2025-05-01 ENCOUNTER — HOSPITAL ENCOUNTER (OUTPATIENT)
Dept: PHYSICAL THERAPY | Age: 65
Setting detail: RECURRING SERIES
Discharge: HOME | End: 2025-05-04
Payer: MEDICARE

## 2025-05-01 PROCEDURE — 97110 THERAPEUTIC EXERCISES: CPT

## 2025-05-01 NOTE — PROGRESS NOTES
Luc Bee  : 1960  Primary: [facility] [employer] Medicare [employer]vantage (Medicare Managed)  Secondary:  [facility] [place] [address]  Phone: [phone]  Fax: [phone] Plan Frequency: 1-3 sessions per week    Plan of Care/Certification Expiration Date: 25        Plan of Care/Certification Expiration Date:  Plan of Care/Certification Expiration Date: 25    Frequency/Duration: Plan Frequency: 1-3 sessions per week      Time In/Out:   Time In: 930  Time Out: 1008      PT Visit Info:         Visit Count:  11    OUTPATIENT PHYSICAL THERAPY:   Treatment Note 2025       Episode  (Therapy evaluation)               Treatment Diagnosis:    Chronic left shoulder pain  Weakness of left shoulder  Cervicalgia  Medical/Referring Diagnosis:      Chronic left shoulder pain (M25.512, G89.29)   Referring Physician:  Baumgarten, Thomas E, MD MD Orders:  PT Eval and Treat   Return MD Appt:   25  Date of Onset:  Onset Date: 25     Allergies:   Patient has no known allergies.  Restrictions/Precautions:   Post Surgical Precautions: see evals      Interventions Planned (Treatment may consist of any combination of the following):     See Assessment Note    Subjective Comments: Pt reports that his shoulder continues to feel good and he's been doing his exercises.   Initial Pain Level:      0/10  Post Session Pain Level:       0/10  Medications Last Reviewed: 2025  Updated Objective Findings:  see evaluation from 3/20/25    4/17/25:   L shoulder passive flexion: 133  L shoulder passive abduction: 105  L shoulder passive ER: 63  L shoulder active flexion: 133  L shoulder active abduction: 106  L shoulder active ER: 50    Goals: (Goals have been discussed and agreed upon with patient.)  Short-Term Functional Goals: Time Frame: 6 weeks  Patient will achieve 120 deg of passive shoulder flexion. MET  Patient will achieve 90 deg of passive shoulder

## 2025-05-06 ENCOUNTER — HOSPITAL ENCOUNTER (OUTPATIENT)
Dept: PHYSICAL THERAPY | Age: 65
Setting detail: RECURRING SERIES
Discharge: HOME | End: 2025-05-09
Payer: MEDICARE

## 2025-05-06 PROCEDURE — 97110 THERAPEUTIC EXERCISES: CPT

## 2025-05-06 NOTE — PROGRESS NOTES
Luc Bee  : 1960  Primary: [facility] [employer] Medicare [employer]vantage (Medicare Managed)  Secondary:  [facility] [place] [address]  Phone: [phone]  Fax: [phone] Plan Frequency: 1-3 sessions per week    Plan of Care/Certification Expiration Date: 25        Plan of Care/Certification Expiration Date:  Plan of Care/Certification Expiration Date: 25    Frequency/Duration: Plan Frequency: 1-3 sessions per week      Time In/Out:   Time In: 1015  Time Out: 1055      PT Visit Info:         Visit Count:  12    OUTPATIENT PHYSICAL THERAPY:   Treatment Note 2025       Episode  (Therapy evaluation)               Treatment Diagnosis:    Chronic left shoulder pain  Weakness of left shoulder  Cervicalgia  Medical/Referring Diagnosis:      Chronic left shoulder pain (M25.512, G89.29)   Referring Physician:  Baumgarten, Thomas E, MD MD Orders:  PT Eval and Treat   Return MD Appt:   25  Date of Onset:  Onset Date: 25     Allergies:   Patient has no known allergies.  Restrictions/Precautions:   Post Surgical Precautions: see evals      Interventions Planned (Treatment may consist of any combination of the following):     See Assessment Note    Subjective Comments: Pt reports that his shoulder was not sore from last session and it has been doing well.   Initial Pain Level:      0/10  Post Session Pain Level:       0/10  Medications Last Reviewed: 2025  Updated Objective Findings:  see evaluation from 3/20/25    4/17/25:   L shoulder passive flexion: 133  L shoulder passive abduction: 105  L shoulder passive ER: 63  L shoulder active flexion: 133  L shoulder active abduction: 106  L shoulder active ER: 50    Goals: (Goals have been discussed and agreed upon with patient.)  Short-Term Functional Goals: Time Frame: 6 weeks  Patient will achieve 120 deg of passive shoulder flexion. MET  Patient will achieve 90 deg of passive shoulder

## 2025-05-08 ENCOUNTER — HOSPITAL ENCOUNTER (OUTPATIENT)
Dept: PHYSICAL THERAPY | Age: 65
Setting detail: RECURRING SERIES
Discharge: HOME | End: 2025-05-11
Payer: MEDICARE

## 2025-05-08 PROCEDURE — 97110 THERAPEUTIC EXERCISES: CPT

## 2025-05-08 NOTE — PROGRESS NOTES
Luc Bee  : 1960  Primary: [facility] [employer] Medicare [employer]age (Medicare Managed)  Secondary:  [facility] [place] [address]  Phone: [phone]  Fax: [phone] Plan Frequency: 1-3 sessions per week    Plan of Care/Certification Expiration Date: 25        Plan of Care/Certification Expiration Date:  Plan of Care/Certification Expiration Date: 25    Frequency/Duration: Plan Frequency: 1-3 sessions per week      Time In/Out:   Time In: 1015  Time Out: 1100      PT Visit Info:         Visit Count:  13    OUTPATIENT PHYSICAL THERAPY:   Treatment and discharge Note 2025       Episode  (Therapy evaluation)               Treatment Diagnosis:    Chronic left shoulder pain  Weakness of left shoulder  Cervicalgia  Medical/Referring Diagnosis:      Chronic left shoulder pain (M25.512, G89.29)   Referring Physician:  Baumgarten, Thomas E, MD MD Orders:  PT Eval and Treat   Return MD Appt:   25  Date of Onset:  Onset Date: 25     Allergies:   Patient has no known allergies.  Restrictions/Precautions:   Post Surgical Precautions: see evals      Interventions Planned (Treatment may consist of any combination of the following):     See Assessment Note    Subjective Comments: Pt reports that his shoulder has been doing well and he continues his exercises.   Initial Pain Level:      0/10  Post Session Pain Level:       0/10  Medications Last Reviewed: 2025  Updated Objective Findings:  see evaluation from 3/20/25    4/17/25:   L shoulder passive flexion: 133  L shoulder passive abduction: 105  L shoulder passive ER: 63  L shoulder active flexion: 133  L shoulder active abduction: 106  L shoulder active ER: 50    25:   L shoulder active flexion to 148*   L shoulder active abduction to 150*   L shoulder ER to 75*  Pt able to reach across body to opposite scapula, behind head to CTJ, and behind back to lumbar region. He reports no pain

## 2025-05-13 ENCOUNTER — APPOINTMENT (OUTPATIENT)
Dept: PHYSICAL THERAPY | Age: 65
End: 2025-05-13
Payer: MEDICARE

## 2025-05-15 ENCOUNTER — APPOINTMENT (OUTPATIENT)
Dept: PHYSICAL THERAPY | Age: 65
End: 2025-05-15
Payer: MEDICARE

## 2025-05-20 ENCOUNTER — APPOINTMENT (OUTPATIENT)
Dept: PHYSICAL THERAPY | Age: 65
End: 2025-05-20
Payer: MEDICARE

## 2025-05-22 ENCOUNTER — APPOINTMENT (OUTPATIENT)
Dept: PHYSICAL THERAPY | Age: 65
End: 2025-05-22
Payer: MEDICARE

## 2025-05-27 ENCOUNTER — APPOINTMENT (OUTPATIENT)
Dept: PHYSICAL THERAPY | Age: 65
End: 2025-05-27
Payer: MEDICARE

## 2025-05-29 ENCOUNTER — APPOINTMENT (OUTPATIENT)
Dept: PHYSICAL THERAPY | Age: 65
End: 2025-05-29
Payer: MEDICARE

## (undated) DEVICE — 1000 S-DRAPE TOWEL DRAPE 10/BX: Brand: STERI-DRAPE™

## (undated) DEVICE — SOLUTION IRRIG 1000ML 0.9% SOD CHL USP POUR PLAS BTL

## (undated) DEVICE — KIT PROCEDURE SURG HEAD AND NECK TOTE

## (undated) DEVICE — SUTURE PROL SZ 6-0 L24IN NONABSORBABLE BLU BV-1 L9.3MM 3/8 M8805

## (undated) DEVICE — GUIDEWIRE VASC L80CM DIA0.018IN TIP L5CM 15DEG ANG NIT

## (undated) DEVICE — Device

## (undated) DEVICE — SYRINGE MED 30ML STD CLR PLAS LUERLOCK TIP N CTRL DISP

## (undated) DEVICE — SUTURE PERMAHAND SZ 2-0 L30IN NONABSORBABLE BLK SH L26MM C016D

## (undated) DEVICE — PAD THRM L UNIV NONSLIP HYDRGEL RECT PROVIDE OPTIMAL ENERGY

## (undated) DEVICE — DRAPE,FEM ANGIO,W/POUCHES, HD: Brand: MEDLINE

## (undated) DEVICE — APPLIER LIG CLP M L11IN TI STR RNG HNDL FOR 20 CLP DISP

## (undated) DEVICE — GLOVE ORANGE PI 7 1/2   MSG9075

## (undated) DEVICE — SUTURE MCRYL SZ 4-0 L27IN ABSRB UD L19MM PS-2 1/2 CIR PRIM Y426H

## (undated) DEVICE — ELECTRODE PT RET AD L9FT HI MOIST COND ADH HYDRGEL CORDED

## (undated) DEVICE — STOCKINETTE,DOUBLE PLY,6X48,STERILE: Brand: MEDLINE

## (undated) DEVICE — MAJOR VASCULAR: Brand: MEDLINE INDUSTRIES, INC.

## (undated) DEVICE — TTL1LYR 16FR10ML 100%SIL TMPST TR: Brand: MEDLINE

## (undated) DEVICE — TUBING, SUCTION, 1/4" X 10', STRAIGHT: Brand: MEDLINE

## (undated) DEVICE — APPLIER CLP L9.38IN M LIG TI DISP STR RNG HNDL LIGACLP

## (undated) DEVICE — LOOP VES W13MM THK09MM MINI RED SIL FLD REPELLENT

## (undated) DEVICE — DRAPE SURG SPEC PROC 4 PC

## (undated) DEVICE — CANISTER, RIGID, 2000CC: Brand: MEDLINE INDUSTRIES, INC.

## (undated) DEVICE — DRAPE IRRIG FLD WRM W44XL44IN W/ AORN STD PRTBL INTRATEMP

## (undated) DEVICE — SUTURE PERMAHAND SZ 3-0 L18IN NONABSORBABLE BLK SILK BRAID A184H

## (undated) DEVICE — PLEDGET VASC W3/16XL3/8IN THK1/16IN PTFE SFT